# Patient Record
Sex: MALE | Race: WHITE | Employment: OTHER | URBAN - METROPOLITAN AREA
[De-identification: names, ages, dates, MRNs, and addresses within clinical notes are randomized per-mention and may not be internally consistent; named-entity substitution may affect disease eponyms.]

---

## 2018-03-23 ENCOUNTER — MEDICAL CORRESPONDENCE (OUTPATIENT)
Dept: HEALTH INFORMATION MANAGEMENT | Facility: CLINIC | Age: 83
End: 2018-03-23

## 2018-03-23 ENCOUNTER — TRANSFERRED RECORDS (OUTPATIENT)
Dept: HEALTH INFORMATION MANAGEMENT | Facility: CLINIC | Age: 83
End: 2018-03-23

## 2018-07-30 ENCOUNTER — TRANSFERRED RECORDS (OUTPATIENT)
Dept: HEALTH INFORMATION MANAGEMENT | Facility: CLINIC | Age: 83
End: 2018-07-30

## 2019-01-01 ENCOUNTER — APPOINTMENT (OUTPATIENT)
Dept: CT IMAGING | Facility: CLINIC | Age: 84
End: 2019-01-01
Payer: MEDICARE

## 2019-01-01 ENCOUNTER — HOSPITAL ENCOUNTER (OUTPATIENT)
Dept: CARDIOLOGY | Facility: CLINIC | Age: 84
Discharge: HOME | End: 2019-04-16
Attending: INTERNAL MEDICINE | Admitting: INTERNAL MEDICINE
Payer: MEDICARE

## 2019-01-01 ENCOUNTER — TELEPHONE (OUTPATIENT)
Dept: FAMILY MEDICINE | Facility: CLINIC | Age: 84
End: 2019-01-01

## 2019-01-01 ENCOUNTER — HOSPITAL ENCOUNTER (EMERGENCY)
Facility: CLINIC | Age: 84
Discharge: HOME | End: 2019-05-02
Attending: EMERGENCY MEDICINE | Admitting: EMERGENCY MEDICINE
Payer: MEDICARE

## 2019-01-01 ENCOUNTER — HOSPITAL ENCOUNTER (EMERGENCY)
Facility: CLINIC | Age: 84
Discharge: HOME | End: 2019-04-04
Attending: EMERGENCY MEDICINE | Admitting: EMERGENCY MEDICINE
Payer: MEDICARE

## 2019-01-01 ENCOUNTER — APPOINTMENT (OUTPATIENT)
Dept: GENERAL RADIOLOGY | Facility: CLINIC | Age: 84
End: 2019-01-01
Attending: EMERGENCY MEDICINE
Payer: MEDICARE

## 2019-01-01 ENCOUNTER — OFFICE VISIT (OUTPATIENT)
Dept: CARDIOLOGY | Facility: CLINIC | Age: 84
End: 2019-01-01
Payer: MEDICARE

## 2019-01-01 ENCOUNTER — TRANSFERRED RECORDS (OUTPATIENT)
Dept: HEALTH INFORMATION MANAGEMENT | Facility: CLINIC | Age: 84
End: 2019-01-01

## 2019-01-01 ENCOUNTER — TELEPHONE (OUTPATIENT)
Dept: OTHER | Facility: CLINIC | Age: 84
End: 2019-01-01

## 2019-01-01 ENCOUNTER — TELEPHONE (OUTPATIENT)
Dept: EMERGENCY MEDICINE | Facility: CLINIC | Age: 84
End: 2019-01-01

## 2019-01-01 ENCOUNTER — APPOINTMENT (OUTPATIENT)
Dept: GENERAL RADIOLOGY | Facility: CLINIC | Age: 84
End: 2019-01-01
Payer: MEDICARE

## 2019-01-01 ENCOUNTER — OFFICE VISIT (OUTPATIENT)
Dept: VASCULAR SURGERY | Facility: CLINIC | Age: 84
End: 2019-01-01
Payer: MEDICARE

## 2019-01-01 ENCOUNTER — MEDICAL CORRESPONDENCE (OUTPATIENT)
Dept: HEALTH INFORMATION MANAGEMENT | Facility: CLINIC | Age: 84
End: 2019-01-01

## 2019-01-01 ENCOUNTER — TELEPHONE (OUTPATIENT)
Dept: PODIATRY | Facility: CLINIC | Age: 84
End: 2019-01-01

## 2019-01-01 ENCOUNTER — HOSPITAL ENCOUNTER (EMERGENCY)
Facility: CLINIC | Age: 84
Discharge: HOME | End: 2019-01-26
Attending: EMERGENCY MEDICINE | Admitting: EMERGENCY MEDICINE
Payer: MEDICARE

## 2019-01-01 ENCOUNTER — NURSING HOME VISIT (OUTPATIENT)
Dept: GERIATRICS | Facility: CLINIC | Age: 84
End: 2019-01-01
Payer: MEDICARE

## 2019-01-01 ENCOUNTER — OFFICE VISIT (OUTPATIENT)
Dept: FAMILY MEDICINE | Facility: CLINIC | Age: 84
End: 2019-01-01
Payer: MEDICARE

## 2019-01-01 ENCOUNTER — APPOINTMENT (OUTPATIENT)
Dept: CT IMAGING | Facility: CLINIC | Age: 84
End: 2019-01-01
Attending: EMERGENCY MEDICINE
Payer: MEDICARE

## 2019-01-01 ENCOUNTER — OFFICE VISIT (OUTPATIENT)
Dept: CARDIOLOGY | Facility: CLINIC | Age: 84
End: 2019-01-01
Attending: INTERNAL MEDICINE
Payer: MEDICARE

## 2019-01-01 ENCOUNTER — HOSPITAL ENCOUNTER (OUTPATIENT)
Dept: RESPIRATORY THERAPY | Facility: CLINIC | Age: 84
Discharge: HOME | End: 2019-03-20
Attending: INTERNAL MEDICINE | Admitting: INTERNAL MEDICINE
Payer: MEDICARE

## 2019-01-01 ENCOUNTER — OFFICE VISIT (OUTPATIENT)
Dept: PODIATRY | Facility: CLINIC | Age: 84
End: 2019-01-01
Payer: MEDICARE

## 2019-01-01 ENCOUNTER — HOSPITAL ENCOUNTER (OUTPATIENT)
Dept: ULTRASOUND IMAGING | Facility: CLINIC | Age: 84
Discharge: HOME | End: 2019-04-26
Attending: PODIATRIST | Admitting: PODIATRIST
Payer: MEDICARE

## 2019-01-01 ENCOUNTER — HOSPITAL ENCOUNTER (OUTPATIENT)
Dept: CARDIOLOGY | Facility: CLINIC | Age: 84
Discharge: HOME | End: 2019-03-13
Attending: INTERNAL MEDICINE | Admitting: INTERNAL MEDICINE
Payer: MEDICARE

## 2019-01-01 VITALS
RESPIRATION RATE: 14 BRPM | HEART RATE: 72 BPM | SYSTOLIC BLOOD PRESSURE: 156 MMHG | TEMPERATURE: 97 F | OXYGEN SATURATION: 99 % | DIASTOLIC BLOOD PRESSURE: 106 MMHG | WEIGHT: 210 LBS

## 2019-01-01 VITALS
OXYGEN SATURATION: 92 % | DIASTOLIC BLOOD PRESSURE: 70 MMHG | HEART RATE: 82 BPM | RESPIRATION RATE: 18 BRPM | SYSTOLIC BLOOD PRESSURE: 128 MMHG | TEMPERATURE: 98.8 F

## 2019-01-01 VITALS
DIASTOLIC BLOOD PRESSURE: 73 MMHG | HEART RATE: 66 BPM | OXYGEN SATURATION: 94 % | WEIGHT: 210 LBS | BODY MASS INDEX: 28.48 KG/M2 | SYSTOLIC BLOOD PRESSURE: 143 MMHG

## 2019-01-01 VITALS
HEART RATE: 84 BPM | HEIGHT: 73 IN | WEIGHT: 206 LBS | OXYGEN SATURATION: 97 % | TEMPERATURE: 101.2 F | SYSTOLIC BLOOD PRESSURE: 100 MMHG | BODY MASS INDEX: 27.3 KG/M2 | RESPIRATION RATE: 20 BRPM | DIASTOLIC BLOOD PRESSURE: 56 MMHG

## 2019-01-01 VITALS
HEART RATE: 97 BPM | SYSTOLIC BLOOD PRESSURE: 128 MMHG | TEMPERATURE: 97.9 F | OXYGEN SATURATION: 94 % | BODY MASS INDEX: 26.52 KG/M2 | RESPIRATION RATE: 20 BRPM | DIASTOLIC BLOOD PRESSURE: 70 MMHG | WEIGHT: 201 LBS

## 2019-01-01 VITALS
HEIGHT: 73 IN | HEART RATE: 59 BPM | BODY MASS INDEX: 27.83 KG/M2 | RESPIRATION RATE: 16 BRPM | WEIGHT: 210 LBS | OXYGEN SATURATION: 99 % | TEMPERATURE: 97.7 F | SYSTOLIC BLOOD PRESSURE: 111 MMHG | DIASTOLIC BLOOD PRESSURE: 75 MMHG

## 2019-01-01 VITALS
RESPIRATION RATE: 20 BRPM | HEIGHT: 73 IN | WEIGHT: 205 LBS | HEART RATE: 66 BPM | DIASTOLIC BLOOD PRESSURE: 54 MMHG | OXYGEN SATURATION: 94 % | TEMPERATURE: 98.6 F | BODY MASS INDEX: 27.17 KG/M2 | SYSTOLIC BLOOD PRESSURE: 96 MMHG

## 2019-01-01 VITALS
BODY MASS INDEX: 27.83 KG/M2 | WEIGHT: 210 LBS | DIASTOLIC BLOOD PRESSURE: 64 MMHG | HEART RATE: 85 BPM | SYSTOLIC BLOOD PRESSURE: 116 MMHG | HEIGHT: 73 IN

## 2019-01-01 VITALS
WEIGHT: 198 LBS | HEART RATE: 81 BPM | BODY MASS INDEX: 26.12 KG/M2 | OXYGEN SATURATION: 97 % | TEMPERATURE: 97.5 F | RESPIRATION RATE: 18 BRPM | SYSTOLIC BLOOD PRESSURE: 140 MMHG | DIASTOLIC BLOOD PRESSURE: 67 MMHG

## 2019-01-01 VITALS
HEIGHT: 72 IN | RESPIRATION RATE: 18 BRPM | SYSTOLIC BLOOD PRESSURE: 148 MMHG | BODY MASS INDEX: 28.44 KG/M2 | TEMPERATURE: 97.9 F | OXYGEN SATURATION: 98 % | WEIGHT: 210 LBS | HEART RATE: 73 BPM | DIASTOLIC BLOOD PRESSURE: 90 MMHG

## 2019-01-01 VITALS
HEART RATE: 78 BPM | TEMPERATURE: 98.1 F | OXYGEN SATURATION: 97 % | SYSTOLIC BLOOD PRESSURE: 116 MMHG | RESPIRATION RATE: 16 BRPM | BODY MASS INDEX: 28.39 KG/M2 | DIASTOLIC BLOOD PRESSURE: 72 MMHG | WEIGHT: 209.3 LBS

## 2019-01-01 VITALS
HEART RATE: 108 BPM | BODY MASS INDEX: 25.6 KG/M2 | OXYGEN SATURATION: 94 % | TEMPERATURE: 97.5 F | RESPIRATION RATE: 18 BRPM | DIASTOLIC BLOOD PRESSURE: 67 MMHG | WEIGHT: 194 LBS | SYSTOLIC BLOOD PRESSURE: 128 MMHG

## 2019-01-01 VITALS — DIASTOLIC BLOOD PRESSURE: 51 MMHG | HEART RATE: 86 BPM | SYSTOLIC BLOOD PRESSURE: 109 MMHG | RESPIRATION RATE: 18 BRPM

## 2019-01-01 VITALS
DIASTOLIC BLOOD PRESSURE: 54 MMHG | HEART RATE: 82 BPM | SYSTOLIC BLOOD PRESSURE: 94 MMHG | WEIGHT: 206 LBS | BODY MASS INDEX: 27.18 KG/M2

## 2019-01-01 VITALS
BODY MASS INDEX: 27.73 KG/M2 | SYSTOLIC BLOOD PRESSURE: 127 MMHG | HEART RATE: 83 BPM | WEIGHT: 210.2 LBS | OXYGEN SATURATION: 98 % | DIASTOLIC BLOOD PRESSURE: 55 MMHG

## 2019-01-01 DIAGNOSIS — I50.32 CHRONIC DIASTOLIC HEART FAILURE (H): ICD-10-CM

## 2019-01-01 DIAGNOSIS — I49.3 PVC'S (PREMATURE VENTRICULAR CONTRACTIONS): ICD-10-CM

## 2019-01-01 DIAGNOSIS — G47.00 INSOMNIA, UNSPECIFIED TYPE: ICD-10-CM

## 2019-01-01 DIAGNOSIS — S91.209A NAIL AVULSION OF TOE, INITIAL ENCOUNTER: ICD-10-CM

## 2019-01-01 DIAGNOSIS — L97.521 TOE ULCER, LEFT, LIMITED TO BREAKDOWN OF SKIN (H): Primary | ICD-10-CM

## 2019-01-01 DIAGNOSIS — E78.5 HYPERLIPIDEMIA LDL GOAL <100: Primary | ICD-10-CM

## 2019-01-01 DIAGNOSIS — A04.72 COLITIS DUE TO CLOSTRIDIUM DIFFICILE: Primary | ICD-10-CM

## 2019-01-01 DIAGNOSIS — I25.708 CORONARY ARTERY DISEASE OF BYPASS GRAFT OF NATIVE HEART WITH STABLE ANGINA PECTORIS (H): Primary | ICD-10-CM

## 2019-01-01 DIAGNOSIS — S52.121D CLOSED DISPLACED FRACTURE OF HEAD OF RIGHT RADIUS WITH ROUTINE HEALING, SUBSEQUENT ENCOUNTER: ICD-10-CM

## 2019-01-01 DIAGNOSIS — Z01.818 PREOP GENERAL PHYSICAL EXAM: Primary | ICD-10-CM

## 2019-01-01 DIAGNOSIS — I35.0 AORTIC VALVE STENOSIS, ETIOLOGY OF CARDIAC VALVE DISEASE UNSPECIFIED: ICD-10-CM

## 2019-01-01 DIAGNOSIS — R79.89 ELEVATED SERUM CREATININE: Primary | ICD-10-CM

## 2019-01-01 DIAGNOSIS — F32.1 MODERATE MAJOR DEPRESSION (H): ICD-10-CM

## 2019-01-01 DIAGNOSIS — Z79.01 LONG TERM CURRENT USE OF ANTICOAGULANT THERAPY: ICD-10-CM

## 2019-01-01 DIAGNOSIS — I73.9 PERIPHERAL VASCULAR DISEASE (H): ICD-10-CM

## 2019-01-01 DIAGNOSIS — Z86.73 HISTORY OF CVA (CEREBROVASCULAR ACCIDENT): ICD-10-CM

## 2019-01-01 DIAGNOSIS — I73.9 PVD (PERIPHERAL VASCULAR DISEASE) (H): ICD-10-CM

## 2019-01-01 DIAGNOSIS — M20.32 HALLUX HAMMERTOE, LEFT: ICD-10-CM

## 2019-01-01 DIAGNOSIS — B95.5 BACTEREMIA DUE TO STREPTOCOCCUS: Primary | ICD-10-CM

## 2019-01-01 DIAGNOSIS — Z79.899 ON AMIODARONE THERAPY: ICD-10-CM

## 2019-01-01 DIAGNOSIS — I48.0 PAROXYSMAL ATRIAL FIBRILLATION (H): ICD-10-CM

## 2019-01-01 DIAGNOSIS — E86.0 DEHYDRATION: ICD-10-CM

## 2019-01-01 DIAGNOSIS — I70.212 ATHEROSCLER OF NATIVE ARTERY OF LEFT LEG WITH INTERMIT CLAUDICATION (H): Primary | ICD-10-CM

## 2019-01-01 DIAGNOSIS — K22.2 ESOPHAGEAL STRICTURE: ICD-10-CM

## 2019-01-01 DIAGNOSIS — M48.061 SPINAL STENOSIS OF LUMBAR REGION, UNSPECIFIED WHETHER NEUROGENIC CLAUDICATION PRESENT: ICD-10-CM

## 2019-01-01 DIAGNOSIS — Z86.19 HISTORY OF CLOSTRIDIUM DIFFICILE COLITIS: ICD-10-CM

## 2019-01-01 DIAGNOSIS — K22.2 ESOPHAGEAL STRICTURE: Primary | ICD-10-CM

## 2019-01-01 DIAGNOSIS — R41.89 COGNITIVE IMPAIRMENT: ICD-10-CM

## 2019-01-01 DIAGNOSIS — I10 ESSENTIAL HYPERTENSION: ICD-10-CM

## 2019-01-01 DIAGNOSIS — R78.81 BACTEREMIA DUE TO STREPTOCOCCUS: ICD-10-CM

## 2019-01-01 DIAGNOSIS — M81.0 OSTEOPOROSIS, UNSPECIFIED OSTEOPOROSIS TYPE, UNSPECIFIED PATHOLOGICAL FRACTURE PRESENCE: Primary | ICD-10-CM

## 2019-01-01 DIAGNOSIS — R07.89 CHEST DISCOMFORT: ICD-10-CM

## 2019-01-01 DIAGNOSIS — M47.9 OSTEOARTHRITIS OF SPINE, UNSPECIFIED SPINAL OSTEOARTHRITIS COMPLICATION STATUS, UNSPECIFIED SPINAL REGION: ICD-10-CM

## 2019-01-01 DIAGNOSIS — S16.1XXA STRAIN OF NECK MUSCLE, INITIAL ENCOUNTER: ICD-10-CM

## 2019-01-01 DIAGNOSIS — Z51.81 ENCOUNTER FOR THERAPEUTIC DRUG MONITORING: ICD-10-CM

## 2019-01-01 DIAGNOSIS — R78.81 BACTEREMIA DUE TO STREPTOCOCCUS: Primary | ICD-10-CM

## 2019-01-01 DIAGNOSIS — M86.9 OSTEOMYELITIS OF TOE OF LEFT FOOT (H): ICD-10-CM

## 2019-01-01 DIAGNOSIS — G89.29 OTHER CHRONIC PAIN: ICD-10-CM

## 2019-01-01 DIAGNOSIS — I95.1 ORTHOSTATIC HYPOTENSION: ICD-10-CM

## 2019-01-01 DIAGNOSIS — N18.30 CKD (CHRONIC KIDNEY DISEASE) STAGE 3, GFR 30-59 ML/MIN (H): ICD-10-CM

## 2019-01-01 DIAGNOSIS — I25.10 ASHD (ARTERIOSCLEROTIC HEART DISEASE): ICD-10-CM

## 2019-01-01 DIAGNOSIS — I48.19 PERSISTENT ATRIAL FIBRILLATION (H): Primary | ICD-10-CM

## 2019-01-01 DIAGNOSIS — E78.5 HYPERLIPIDEMIA LDL GOAL <70: ICD-10-CM

## 2019-01-01 DIAGNOSIS — E13.621 DIABETIC ULCER OF TOE OF LEFT FOOT ASSOCIATED WITH DIABETES MELLITUS OF OTHER TYPE, UNSPECIFIED ULCER STAGE (H): Primary | ICD-10-CM

## 2019-01-01 DIAGNOSIS — L97.522: Primary | ICD-10-CM
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
## 2019-01-01 DIAGNOSIS — G62.9 PERIPHERAL POLYNEUROPATHY: ICD-10-CM

## 2019-01-01 DIAGNOSIS — K21.00 GASTROESOPHAGEAL REFLUX DISEASE WITH ESOPHAGITIS: ICD-10-CM

## 2019-01-01 DIAGNOSIS — L97.521 TOE ULCER, LEFT, LIMITED TO BREAKDOWN OF SKIN (H): ICD-10-CM

## 2019-01-01 DIAGNOSIS — I48.19 PERSISTENT ATRIAL FIBRILLATION (H): ICD-10-CM

## 2019-01-01 DIAGNOSIS — I48.20 CHRONIC ATRIAL FIBRILLATION (H): ICD-10-CM

## 2019-01-01 DIAGNOSIS — E78.5 HYPERLIPIDEMIA LDL GOAL <100: ICD-10-CM

## 2019-01-01 DIAGNOSIS — S41.111A ARM LACERATION, RIGHT, INITIAL ENCOUNTER: ICD-10-CM

## 2019-01-01 DIAGNOSIS — I25.10 ASHD (ARTERIOSCLEROTIC HEART DISEASE): Primary | ICD-10-CM

## 2019-01-01 DIAGNOSIS — Z79.899 ON AMIODARONE THERAPY: Primary | ICD-10-CM

## 2019-01-01 DIAGNOSIS — L97.529 DIABETIC ULCER OF TOE OF LEFT FOOT ASSOCIATED WITH DIABETES MELLITUS OF OTHER TYPE, UNSPECIFIED ULCER STAGE (H): Primary | ICD-10-CM

## 2019-01-01 DIAGNOSIS — B95.5 BACTEREMIA DUE TO STREPTOCOCCUS: ICD-10-CM

## 2019-01-01 DIAGNOSIS — R29.6 FALLS FREQUENTLY: Primary | ICD-10-CM

## 2019-01-01 DIAGNOSIS — D64.9 ANEMIA, UNSPECIFIED TYPE: ICD-10-CM

## 2019-01-01 DIAGNOSIS — R60.0 PERIPHERAL EDEMA: ICD-10-CM

## 2019-01-01 DIAGNOSIS — Z79.01 CHRONIC ANTICOAGULATION: ICD-10-CM

## 2019-01-01 DIAGNOSIS — K59.00 CONSTIPATION, UNSPECIFIED CONSTIPATION TYPE: ICD-10-CM

## 2019-01-01 DIAGNOSIS — I48.91 ATRIAL FIBRILLATION (H): Primary | ICD-10-CM

## 2019-01-01 DIAGNOSIS — R19.7 DIARRHEA, UNSPECIFIED TYPE: ICD-10-CM

## 2019-01-01 DIAGNOSIS — I48.91 ATRIAL FIBRILLATION (H): ICD-10-CM

## 2019-01-01 DIAGNOSIS — F32.A DEPRESSION, UNSPECIFIED DEPRESSION TYPE: ICD-10-CM

## 2019-01-01 DIAGNOSIS — I73.9 PVD (PERIPHERAL VASCULAR DISEASE) (H): Primary | ICD-10-CM

## 2019-01-01 DIAGNOSIS — L97.511 SKIN ULCER OF TOE OF RIGHT FOOT, LIMITED TO BREAKDOWN OF SKIN (H): ICD-10-CM

## 2019-01-01 LAB
ALBUMIN SERPL-MCNC: 3.1 G/DL (ref 3.4–5)
ALBUMIN SERPL-MCNC: 3.9 G/DL (ref 3.4–5)
ALP SERPL-CCNC: 170 U/L (ref 40–150)
ALP SERPL-CCNC: 292 U/L (ref 40–150)
ALT SERPL W P-5'-P-CCNC: 13 U/L (ref 0–70)
ALT SERPL W P-5'-P-CCNC: 16 U/L (ref 0–70)
ALT SERPL W P-5'-P-CCNC: 20 U/L (ref 0–70)
ALT SERPL W P-5'-P-CCNC: 24 U/L (ref 0–70)
ANION GAP SERPL CALCULATED.3IONS-SCNC: 4 MMOL/L (ref 3–14)
ANION GAP SERPL CALCULATED.3IONS-SCNC: 5 MMOL/L (ref 3–14)
ANION GAP SERPL CALCULATED.3IONS-SCNC: 6 MMOL/L (ref 3–14)
ANION GAP SERPL CALCULATED.3IONS-SCNC: 7 MMOL/L (ref 3–14)
ANION GAP SERPL CALCULATED.3IONS-SCNC: 7 MMOL/L (ref 3–14)
AST SERPL W P-5'-P-CCNC: 13 U/L (ref 0–45)
AST SERPL W P-5'-P-CCNC: 17 U/L (ref 0–45)
AST SERPL W P-5'-P-CCNC: 21 U/L (ref 0–45)
BASOPHILS # BLD AUTO: 0 10E9/L (ref 0–0.2)
BASOPHILS # BLD AUTO: 0.1 10E9/L (ref 0–0.2)
BASOPHILS NFR BLD AUTO: 0.6 %
BASOPHILS NFR BLD AUTO: 1 %
BILIRUB DIRECT SERPL-MCNC: 0.2 MG/DL (ref 0–0.2)
BILIRUB SERPL-MCNC: 0.6 MG/DL (ref 0.2–1.3)
BILIRUB SERPL-MCNC: 0.8 MG/DL (ref 0.2–1.3)
BUN SERPL-MCNC: 17 MG/DL (ref 7–30)
BUN SERPL-MCNC: 28 MG/DL (ref 7–30)
BUN SERPL-MCNC: 32 MG/DL (ref 7–30)
BUN SERPL-MCNC: 33 MG/DL (ref 7–30)
BUN SERPL-MCNC: 35 MG/DL (ref 7–30)
C COLI+JEJUNI+LARI FUSA STL QL NAA+PROBE: NOT DETECTED
C DIFF TOX B STL QL: POSITIVE
CALCIUM SERPL-MCNC: 9.1 MG/DL (ref 8.5–10.1)
CALCIUM SERPL-MCNC: 9.1 MG/DL (ref 8.5–10.1)
CALCIUM SERPL-MCNC: 9.3 MG/DL (ref 8.5–10.1)
CALCIUM SERPL-MCNC: 9.3 MG/DL (ref 8.5–10.1)
CALCIUM SERPL-MCNC: 9.4 MG/DL (ref 8.5–10.1)
CHLORIDE SERPL-SCNC: 102 MMOL/L (ref 94–109)
CHLORIDE SERPL-SCNC: 104 MMOL/L (ref 94–109)
CHLORIDE SERPL-SCNC: 105 MMOL/L (ref 94–109)
CHLORIDE SERPL-SCNC: 109 MMOL/L (ref 94–109)
CHLORIDE SERPL-SCNC: 111 MMOL/L (ref 94–109)
CHOLEST SERPL-MCNC: 132 MG/DL
CO2 SERPL-SCNC: 25 MMOL/L (ref 20–32)
CO2 SERPL-SCNC: 30 MMOL/L (ref 20–32)
CO2 SERPL-SCNC: 30 MMOL/L (ref 20–32)
CO2 SERPL-SCNC: 31 MMOL/L (ref 20–32)
CO2 SERPL-SCNC: 32 MMOL/L (ref 20–32)
CREAT SERPL-MCNC: 1.41 MG/DL (ref 0.66–1.25)
CREAT SERPL-MCNC: 1.43 MG/DL (ref 0.66–1.25)
CREAT SERPL-MCNC: 1.58 MG/DL (ref 0.66–1.25)
CREAT SERPL-MCNC: 1.62 MG/DL (ref 0.66–1.25)
CREAT SERPL-MCNC: 1.91 MG/DL (ref 0.66–1.25)
CREAT SERPL-MCNC: 2.23 MG/DL (ref 0.66–1.25)
DIFFERENTIAL METHOD BLD: ABNORMAL
DIFFERENTIAL METHOD BLD: ABNORMAL
DLCOCOR-%PRED-PRE: 100 %
DLCOCOR-PRE: 25.1 ML/MIN/MMHG
DLCOUNC-%PRED-PRE: 86 %
DLCOUNC-PRE: 21.73 ML/MIN/MMHG
DLCOUNC-PRED: 25.01 ML/MIN/MMHG
EC STX1 GENE STL QL NAA+PROBE: NOT DETECTED
EC STX2 GENE STL QL NAA+PROBE: NOT DETECTED
ENTERIC PATHOGEN COMMENT: NORMAL
EOSINOPHIL # BLD AUTO: 0.2 10E9/L (ref 0–0.7)
EOSINOPHIL # BLD AUTO: 0.3 10E9/L (ref 0–0.7)
EOSINOPHIL NFR BLD AUTO: 3.2 %
EOSINOPHIL NFR BLD AUTO: 5.4 %
ERV-%PRED-PRE: 148 %
ERV-PRE: 1.3 L
ERV-PRED: 0.87 L
ERYTHROCYTE [DISTWIDTH] IN BLOOD BY AUTOMATED COUNT: 13.3 % (ref 10–15)
ERYTHROCYTE [DISTWIDTH] IN BLOOD BY AUTOMATED COUNT: 13.5 % (ref 10–15)
ERYTHROCYTE [DISTWIDTH] IN BLOOD BY AUTOMATED COUNT: 13.5 % (ref 10–15)
EXPTIME-PRE: 6.47 SEC
FEF2575-%PRED-PRE: 76 %
FEF2575-PRE: 1.57 L/SEC
FEF2575-PRED: 2.04 L/SEC
FEFMAX-%PRED-PRE: 80 %
FEFMAX-PRE: 5.87 L/SEC
FEFMAX-PRED: 7.34 L/SEC
FEV1-%PRED-PRE: 82 %
FEV1-PRE: 2.42 L
FEV1FEV6-PRE: 72 %
FEV1FEV6-PRED: 76 %
FEV1FVC-PRE: 71 %
FEV1FVC-PRED: 74 %
FEV1SVC-PRE: 72 %
FEV1SVC-PRED: 63 %
FIFMAX-PRE: 4.45 L/SEC
FVC-%PRED-PRE: 85 %
FVC-PRE: 3.41 L
FVC-PRED: 3.99 L
GFR SERPL CREATININE-BSD FRML MDRD: 26 ML/MIN/{1.73_M2}
GFR SERPL CREATININE-BSD FRML MDRD: 32 ML/MIN/{1.73_M2}
GFR SERPL CREATININE-BSD FRML MDRD: 39 ML/MIN/{1.73_M2}
GFR SERPL CREATININE-BSD FRML MDRD: 40 ML/MIN/{1.73_M2}
GFR SERPL CREATININE-BSD FRML MDRD: 45 ML/MIN/{1.73_M2}
GFR SERPL CREATININE-BSD FRML MDRD: 46 ML/MIN/{1.73_M2}
GLUCOSE SERPL-MCNC: 125 MG/DL (ref 70–99)
GLUCOSE SERPL-MCNC: 84 MG/DL (ref 70–99)
GLUCOSE SERPL-MCNC: 86 MG/DL (ref 70–99)
GLUCOSE SERPL-MCNC: 95 MG/DL (ref 70–99)
GLUCOSE SERPL-MCNC: 99 MG/DL (ref 70–99)
HCT VFR BLD AUTO: 33 % (ref 40–53)
HCT VFR BLD AUTO: 35.1 % (ref 40–53)
HCT VFR BLD AUTO: 35.6 % (ref 40–53)
HDLC SERPL-MCNC: 43 MG/DL
HGB BLD-MCNC: 10.6 G/DL (ref 13.3–17.7)
HGB BLD-MCNC: 11.3 G/DL (ref 13.3–17.7)
HGB BLD-MCNC: 11.8 G/DL (ref 13.3–17.7)
IC-%PRED-PRE: 52 %
IC-PRE: 2.01 L
IC-PRED: 3.81 L
IMM GRANULOCYTES # BLD: 0 10E9/L (ref 0–0.4)
IMM GRANULOCYTES # BLD: 0 10E9/L (ref 0–0.4)
IMM GRANULOCYTES NFR BLD: 0.2 %
IMM GRANULOCYTES NFR BLD: 0.4 %
LDLC SERPL CALC-MCNC: 64 MG/DL
LYMPHOCYTES # BLD AUTO: 1.1 10E9/L (ref 0.8–5.3)
LYMPHOCYTES # BLD AUTO: 1.3 10E9/L (ref 0.8–5.3)
LYMPHOCYTES NFR BLD AUTO: 10.6 %
LYMPHOCYTES NFR BLD AUTO: 32.7 %
MCH RBC QN AUTO: 31 PG (ref 26.5–33)
MCH RBC QN AUTO: 32.1 PG (ref 26.5–33)
MCH RBC QN AUTO: 33.2 PG (ref 26.5–33)
MCHC RBC AUTO-ENTMCNC: 32.1 G/DL (ref 31.5–36.5)
MCHC RBC AUTO-ENTMCNC: 32.2 G/DL (ref 31.5–36.5)
MCHC RBC AUTO-ENTMCNC: 33.1 G/DL (ref 31.5–36.5)
MCV RBC AUTO: 100 FL (ref 78–100)
MCV RBC AUTO: 100 FL (ref 78–100)
MCV RBC AUTO: 96 FL (ref 78–100)
MONOCYTES # BLD AUTO: 0.3 10E9/L (ref 0–1.3)
MONOCYTES # BLD AUTO: 0.7 10E9/L (ref 0–1.3)
MONOCYTES NFR BLD AUTO: 6.7 %
MONOCYTES NFR BLD AUTO: 7.6 %
NEUTROPHILS # BLD AUTO: 2.2 10E9/L (ref 1.6–8.3)
NEUTROPHILS # BLD AUTO: 8.1 10E9/L (ref 1.6–8.3)
NEUTROPHILS NFR BLD AUTO: 53.1 %
NEUTROPHILS NFR BLD AUTO: 78.5 %
NONHDLC SERPL-MCNC: 89 MG/DL
NOROV GI+II ORF1-ORF2 JNC STL QL NAA+PR: NOT DETECTED
NRBC # BLD AUTO: 0 10*3/UL
NRBC # BLD AUTO: 0 10*3/UL
NRBC BLD AUTO-RTO: 0 /100
NRBC BLD AUTO-RTO: 0 /100
NT-PROBNP SERPL-MCNC: 3831 PG/ML (ref 0–450)
NT-PROBNP SERPL-MCNC: 4203 PG/ML (ref 0–450)
PLATELET # BLD AUTO: 152 10E9/L (ref 150–450)
PLATELET # BLD AUTO: 153 10E9/L (ref 150–450)
PLATELET # BLD AUTO: 293 10E9/L (ref 150–450)
POTASSIUM SERPL-SCNC: 4 MMOL/L (ref 3.4–5.3)
POTASSIUM SERPL-SCNC: 4.5 MMOL/L (ref 3.4–5.3)
POTASSIUM SERPL-SCNC: 4.5 MMOL/L (ref 3.4–5.3)
POTASSIUM SERPL-SCNC: 4.6 MMOL/L (ref 3.4–5.3)
POTASSIUM SERPL-SCNC: 4.6 MMOL/L (ref 3.4–5.3)
PROT SERPL-MCNC: 7 G/DL (ref 6.8–8.8)
PROT SERPL-MCNC: 7.2 G/DL (ref 6.8–8.8)
RBC # BLD AUTO: 3.3 10E12/L (ref 4.4–5.9)
RBC # BLD AUTO: 3.55 10E12/L (ref 4.4–5.9)
RBC # BLD AUTO: 3.64 10E12/L (ref 4.4–5.9)
RVA NSP5 STL QL NAA+PROBE: NOT DETECTED
SALMONELLA SP RPOD STL QL NAA+PROBE: NOT DETECTED
SHIGELLA SP+EIEC IPAH STL QL NAA+PROBE: NOT DETECTED
SODIUM SERPL-SCNC: 139 MMOL/L (ref 133–144)
SODIUM SERPL-SCNC: 141 MMOL/L (ref 133–144)
SODIUM SERPL-SCNC: 142 MMOL/L (ref 133–144)
SODIUM SERPL-SCNC: 143 MMOL/L (ref 133–144)
SODIUM SERPL-SCNC: 143 MMOL/L (ref 133–144)
SPECIMEN SOURCE: ABNORMAL
TRIGL SERPL-MCNC: 126 MG/DL
TROPONIN I SERPL-MCNC: 0.02 UG/L (ref 0–0.04)
TROPONIN I SERPL-MCNC: 0.02 UG/L (ref 0–0.04)
TSH SERPL DL<=0.005 MIU/L-ACNC: 2.11 MU/L (ref 0.4–4)
V CHOL+PARA RFBL+TRKH+TNAA STL QL NAA+PR: NOT DETECTED
VA-%PRED-PRE: 82 %
VA-PRE: 5.36 L
VC-%PRED-PRE: 72 %
VC-PRE: 3.38 L
VC-PRED: 4.68 L
WBC # BLD AUTO: 10.4 10E9/L (ref 4–11)
WBC # BLD AUTO: 4.1 10E9/L (ref 4–11)
WBC # BLD AUTO: 5.2 10E9/L (ref 4–11)
Y ENTERO RECN STL QL NAA+PROBE: NOT DETECTED

## 2019-01-01 PROCEDURE — 82565 ASSAY OF CREATININE: CPT | Performed by: NURSE PRACTITIONER

## 2019-01-01 PROCEDURE — 85027 COMPLETE CBC AUTOMATED: CPT | Performed by: NURSE PRACTITIONER

## 2019-01-01 PROCEDURE — 93010 ELECTROCARDIOGRAM REPORT: CPT | Performed by: INTERNAL MEDICINE

## 2019-01-01 PROCEDURE — 99214 OFFICE O/P EST MOD 30 MIN: CPT | Performed by: INTERNAL MEDICINE

## 2019-01-01 PROCEDURE — 84460 ALANINE AMINO (ALT) (SGPT): CPT | Performed by: INTERNAL MEDICINE

## 2019-01-01 PROCEDURE — 99285 EMERGENCY DEPT VISIT HI MDM: CPT | Mod: 25 | Performed by: EMERGENCY MEDICINE

## 2019-01-01 PROCEDURE — 99284 EMERGENCY DEPT VISIT MOD MDM: CPT | Mod: 25 | Performed by: EMERGENCY MEDICINE

## 2019-01-01 PROCEDURE — 94729 DIFFUSING CAPACITY: CPT | Mod: 26 | Performed by: INTERNAL MEDICINE

## 2019-01-01 PROCEDURE — 71046 X-RAY EXAM CHEST 2 VIEWS: CPT

## 2019-01-01 PROCEDURE — 85025 COMPLETE CBC W/AUTO DIFF WBC: CPT | Performed by: EMERGENCY MEDICINE

## 2019-01-01 PROCEDURE — 99309 SBSQ NF CARE MODERATE MDM 30: CPT | Performed by: NURSE PRACTITIONER

## 2019-01-01 PROCEDURE — 36415 COLL VENOUS BLD VENIPUNCTURE: CPT | Performed by: NURSE PRACTITIONER

## 2019-01-01 PROCEDURE — 99215 OFFICE O/P EST HI 40 MIN: CPT | Performed by: NURSE PRACTITIONER

## 2019-01-01 PROCEDURE — 80061 LIPID PANEL: CPT | Performed by: FAMILY MEDICINE

## 2019-01-01 PROCEDURE — 94726 PLETHYSMOGRAPHY LUNG VOLUMES: CPT

## 2019-01-01 PROCEDURE — 93924 LWR XTR VASC STDY BILAT: CPT

## 2019-01-01 PROCEDURE — 96376 TX/PRO/DX INJ SAME DRUG ADON: CPT | Performed by: EMERGENCY MEDICINE

## 2019-01-01 PROCEDURE — 12004 RPR S/N/AX/GEN/TRK7.6-12.5CM: CPT | Mod: RT | Performed by: EMERGENCY MEDICINE

## 2019-01-01 PROCEDURE — 80053 COMPREHEN METABOLIC PANEL: CPT | Performed by: EMERGENCY MEDICINE

## 2019-01-01 PROCEDURE — 99283 EMERGENCY DEPT VISIT LOW MDM: CPT | Mod: 25 | Performed by: EMERGENCY MEDICINE

## 2019-01-01 PROCEDURE — 99214 OFFICE O/P EST MOD 30 MIN: CPT | Performed by: FAMILY MEDICINE

## 2019-01-01 PROCEDURE — 99203 OFFICE O/P NEW LOW 30 MIN: CPT | Performed by: INTERNAL MEDICINE

## 2019-01-01 PROCEDURE — 84443 ASSAY THYROID STIM HORMONE: CPT | Performed by: INTERNAL MEDICINE

## 2019-01-01 PROCEDURE — 36415 COLL VENOUS BLD VENIPUNCTURE: CPT | Performed by: INTERNAL MEDICINE

## 2019-01-01 PROCEDURE — 84450 TRANSFERASE (AST) (SGOT): CPT | Performed by: INTERNAL MEDICINE

## 2019-01-01 PROCEDURE — 93010 ELECTROCARDIOGRAM REPORT: CPT | Mod: Z6 | Performed by: EMERGENCY MEDICINE

## 2019-01-01 PROCEDURE — 25000128 H RX IP 250 OP 636: Performed by: EMERGENCY MEDICINE

## 2019-01-01 PROCEDURE — 27210282 ZZH ADHESIVE DERMABOND SKIN: Performed by: EMERGENCY MEDICINE

## 2019-01-01 PROCEDURE — 93005 ELECTROCARDIOGRAM TRACING: CPT

## 2019-01-01 PROCEDURE — 94010 BREATHING CAPACITY TEST: CPT | Mod: 26 | Performed by: INTERNAL MEDICINE

## 2019-01-01 PROCEDURE — 99214 OFFICE O/P EST MOD 30 MIN: CPT | Performed by: NURSE PRACTITIONER

## 2019-01-01 PROCEDURE — 80048 BASIC METABOLIC PNL TOTAL CA: CPT | Performed by: INTERNAL MEDICINE

## 2019-01-01 PROCEDURE — 72125 CT NECK SPINE W/O DYE: CPT

## 2019-01-01 PROCEDURE — 74177 CT ABD & PELVIS W/CONTRAST: CPT

## 2019-01-01 PROCEDURE — 25000125 ZZHC RX 250: Performed by: EMERGENCY MEDICINE

## 2019-01-01 PROCEDURE — 80048 BASIC METABOLIC PNL TOTAL CA: CPT | Performed by: NURSE PRACTITIONER

## 2019-01-01 PROCEDURE — 83880 ASSAY OF NATRIURETIC PEPTIDE: CPT | Performed by: NURSE PRACTITIONER

## 2019-01-01 PROCEDURE — 25800030 ZZH RX IP 258 OP 636: Performed by: EMERGENCY MEDICINE

## 2019-01-01 PROCEDURE — 70450 CT HEAD/BRAIN W/O DYE: CPT

## 2019-01-01 PROCEDURE — 94010 BREATHING CAPACITY TEST: CPT

## 2019-01-01 PROCEDURE — 84484 ASSAY OF TROPONIN QUANT: CPT | Performed by: EMERGENCY MEDICINE

## 2019-01-01 PROCEDURE — 93306 TTE W/DOPPLER COMPLETE: CPT | Mod: 26 | Performed by: INTERNAL MEDICINE

## 2019-01-01 PROCEDURE — 87493 C DIFF AMPLIFIED PROBE: CPT | Mod: XU | Performed by: EMERGENCY MEDICINE

## 2019-01-01 PROCEDURE — 99204 OFFICE O/P NEW MOD 45 MIN: CPT | Performed by: SURGERY

## 2019-01-01 PROCEDURE — 99213 OFFICE O/P EST LOW 20 MIN: CPT | Performed by: PODIATRIST

## 2019-01-01 PROCEDURE — 96361 HYDRATE IV INFUSION ADD-ON: CPT | Performed by: EMERGENCY MEDICINE

## 2019-01-01 PROCEDURE — 99310 SBSQ NF CARE HIGH MDM 45: CPT | Performed by: NURSE PRACTITIONER

## 2019-01-01 PROCEDURE — 87506 IADNA-DNA/RNA PROBE TQ 6-11: CPT | Performed by: EMERGENCY MEDICINE

## 2019-01-01 PROCEDURE — 80048 BASIC METABOLIC PNL TOTAL CA: CPT | Performed by: EMERGENCY MEDICINE

## 2019-01-01 PROCEDURE — 83880 ASSAY OF NATRIURETIC PEPTIDE: CPT | Performed by: INTERNAL MEDICINE

## 2019-01-01 PROCEDURE — 96374 THER/PROPH/DIAG INJ IV PUSH: CPT | Mod: 59 | Performed by: EMERGENCY MEDICINE

## 2019-01-01 PROCEDURE — 80076 HEPATIC FUNCTION PANEL: CPT | Performed by: EMERGENCY MEDICINE

## 2019-01-01 PROCEDURE — 93306 TTE W/DOPPLER COMPLETE: CPT

## 2019-01-01 PROCEDURE — 73660 X-RAY EXAM OF TOE(S): CPT | Mod: RT

## 2019-01-01 PROCEDURE — 99204 OFFICE O/P NEW MOD 45 MIN: CPT | Performed by: FAMILY MEDICINE

## 2019-01-01 PROCEDURE — 99207 ZZC CDG-MDM COMPONENT: MEETS LOW - DOWN CODED: CPT | Performed by: NURSE PRACTITIONER

## 2019-01-01 PROCEDURE — 96375 TX/PRO/DX INJ NEW DRUG ADDON: CPT | Performed by: EMERGENCY MEDICINE

## 2019-01-01 PROCEDURE — 99284 EMERGENCY DEPT VISIT MOD MDM: CPT | Mod: Z6 | Performed by: EMERGENCY MEDICINE

## 2019-01-01 PROCEDURE — 96360 HYDRATION IV INFUSION INIT: CPT | Performed by: EMERGENCY MEDICINE

## 2019-01-01 PROCEDURE — 93005 ELECTROCARDIOGRAM TRACING: CPT | Performed by: EMERGENCY MEDICINE

## 2019-01-01 PROCEDURE — 94729 DIFFUSING CAPACITY: CPT

## 2019-01-01 RX ORDER — ATORVASTATIN CALCIUM 40 MG/1
40 TABLET, FILM COATED ORAL EVERY EVENING
COMMUNITY
End: 2019-01-01

## 2019-01-01 RX ORDER — RANOLAZINE 500 MG/1
500 TABLET, EXTENDED RELEASE ORAL 2 TIMES DAILY
Qty: 60 TABLET | Refills: 11 | Status: SHIPPED | OUTPATIENT
Start: 2019-01-01 | End: 2019-01-01

## 2019-01-01 RX ORDER — FUROSEMIDE 40 MG
60 TABLET ORAL DAILY
Qty: 135 TABLET | Refills: 3 | Status: SHIPPED | OUTPATIENT
Start: 2019-01-01 | End: 2019-01-01

## 2019-01-01 RX ORDER — NITROGLYCERIN 0.4 MG/1
0.4 TABLET SUBLINGUAL EVERY 5 MIN PRN
COMMUNITY
Start: 2016-03-24 | End: 2019-01-01

## 2019-01-01 RX ORDER — OXYCODONE HYDROCHLORIDE 5 MG/1
5-10 TABLET ORAL EVERY 4 HOURS PRN
Qty: 93 TABLET | Refills: 0 | Status: SHIPPED | OUTPATIENT
Start: 2019-01-01 | End: 2019-01-01

## 2019-01-01 RX ORDER — WARFARIN SODIUM 1 MG/1
1 TABLET ORAL SEE ADMIN INSTRUCTIONS
Start: 2019-01-01

## 2019-01-01 RX ORDER — VENLAFAXINE HYDROCHLORIDE 37.5 MG/1
37.5 TABLET, EXTENDED RELEASE ORAL DAILY
Start: 2019-01-01

## 2019-01-01 RX ORDER — FERROUS SULFATE 325(65) MG
325 TABLET, DELAYED RELEASE (ENTERIC COATED) ORAL DAILY
Start: 2019-01-01

## 2019-01-01 RX ORDER — CITALOPRAM HYDROBROMIDE 40 MG/1
40 TABLET ORAL DAILY
COMMUNITY
End: 2019-01-01

## 2019-01-01 RX ORDER — FUROSEMIDE 40 MG
40 TABLET ORAL DAILY
Qty: 90 TABLET | Refills: 1 | Status: SHIPPED | OUTPATIENT
Start: 2019-01-01

## 2019-01-01 RX ORDER — OXYCODONE HYDROCHLORIDE 5 MG/1
5-10 TABLET ORAL EVERY 4 HOURS PRN
Qty: 90 TABLET | Refills: 0 | Status: SHIPPED | OUTPATIENT
Start: 2019-01-01 | End: 2019-01-01

## 2019-01-01 RX ORDER — GABAPENTIN 400 MG/1
400 CAPSULE ORAL 2 TIMES DAILY
COMMUNITY
End: 2019-01-01

## 2019-01-01 RX ORDER — CLOPIDOGREL BISULFATE 75 MG/1
75 TABLET ORAL DAILY
COMMUNITY
End: 2019-01-01

## 2019-01-01 RX ORDER — RANOLAZINE 500 MG/1
500 TABLET, EXTENDED RELEASE ORAL 2 TIMES DAILY
Start: 2019-01-01

## 2019-01-01 RX ORDER — ACETAMINOPHEN 325 MG/1
650 TABLET ORAL 3 TIMES DAILY
Start: 2019-01-01

## 2019-01-01 RX ORDER — CITALOPRAM HYDROBROMIDE 40 MG/1
40 TABLET ORAL DAILY
Qty: 90 TABLET | Refills: 3 | Status: SHIPPED | OUTPATIENT
Start: 2019-01-01 | End: 2019-01-01

## 2019-01-01 RX ORDER — AMLODIPINE BESYLATE 2.5 MG/1
2.5 TABLET ORAL DAILY
Qty: 90 TABLET | Refills: 3 | Status: SHIPPED | OUTPATIENT
Start: 2019-01-01 | End: 2019-01-01

## 2019-01-01 RX ORDER — LEVETIRACETAM 500 MG/1
500 TABLET ORAL 2 TIMES DAILY
Start: 2019-01-01

## 2019-01-01 RX ORDER — HYDROCODONE BITARTRATE AND ACETAMINOPHEN 5; 325 MG/1; MG/1
1 TABLET ORAL EVERY 6 HOURS PRN
Qty: 7 TABLET | Refills: 0 | Status: SHIPPED | OUTPATIENT
Start: 2019-01-01 | End: 2019-01-01

## 2019-01-01 RX ORDER — AMIODARONE HYDROCHLORIDE 200 MG/1
100 TABLET ORAL DAILY
Qty: 45 TABLET | Refills: 3 | Status: SHIPPED | OUTPATIENT
Start: 2019-01-01

## 2019-01-01 RX ORDER — IOPAMIDOL 755 MG/ML
100 INJECTION, SOLUTION INTRAVASCULAR ONCE
Status: COMPLETED | OUTPATIENT
Start: 2019-01-01 | End: 2019-01-01

## 2019-01-01 RX ORDER — FUROSEMIDE 40 MG
40 TABLET ORAL DAILY
Qty: 90 TABLET | Refills: 3 | Status: SHIPPED | OUTPATIENT
Start: 2019-01-01 | End: 2019-01-01

## 2019-01-01 RX ORDER — LANOLIN ALCOHOL/MO/W.PET/CERES
6 CREAM (GRAM) TOPICAL AT BEDTIME
Start: 2019-01-01

## 2019-01-01 RX ORDER — AMIODARONE HYDROCHLORIDE 200 MG/1
100 TABLET ORAL DAILY
COMMUNITY
Start: 2016-03-24 | End: 2019-01-01

## 2019-01-01 RX ORDER — POLYETHYLENE GLYCOL 3350 17 G/17G
17 POWDER, FOR SOLUTION ORAL DAILY
Start: 2019-01-01

## 2019-01-01 RX ORDER — NITROGLYCERIN 0.4 MG/1
0.4 TABLET SUBLINGUAL EVERY 5 MIN PRN
Qty: 25 TABLET | Refills: 6 | Status: SHIPPED | OUTPATIENT
Start: 2019-01-01

## 2019-01-01 RX ORDER — ASPIRIN 81 MG/1
81 TABLET, CHEWABLE ORAL DAILY
Start: 2019-01-01

## 2019-01-01 RX ORDER — CALCITONIN SALMON 200 [IU]/.09ML
1 SPRAY, METERED NASAL DAILY
Qty: 1 BOTTLE | Refills: 11 | Status: SHIPPED | OUTPATIENT
Start: 2019-01-01

## 2019-01-01 RX ORDER — OXYCODONE HYDROCHLORIDE 5 MG/1
2.5 TABLET ORAL EVERY 6 HOURS PRN
Qty: 12 TABLET | Refills: 0
Start: 2019-01-01 | End: 2019-01-01

## 2019-01-01 RX ORDER — OXYCODONE HYDROCHLORIDE 5 MG/1
TABLET ORAL
Qty: 30 TABLET | Refills: 0 | Status: SHIPPED | OUTPATIENT
Start: 2019-01-01

## 2019-01-01 RX ORDER — AMLODIPINE BESYLATE 2.5 MG/1
2.5 TABLET ORAL DAILY
COMMUNITY
Start: 2016-03-24 | End: 2019-01-01

## 2019-01-01 RX ORDER — GABAPENTIN 400 MG/1
400 CAPSULE ORAL 2 TIMES DAILY
Qty: 180 CAPSULE | Refills: 3 | Status: SHIPPED | OUTPATIENT
Start: 2019-01-01

## 2019-01-01 RX ORDER — DOCUSATE SODIUM 100 MG/1
200 CAPSULE, LIQUID FILLED ORAL DAILY PRN
COMMUNITY

## 2019-01-01 RX ORDER — AMLODIPINE BESYLATE 2.5 MG/1
2.5 TABLET ORAL DAILY
Start: 2019-01-01

## 2019-01-01 RX ORDER — FENTANYL CITRATE 50 UG/ML
25 INJECTION, SOLUTION INTRAMUSCULAR; INTRAVENOUS
Status: COMPLETED | OUTPATIENT
Start: 2019-01-01 | End: 2019-01-01

## 2019-01-01 RX ORDER — CITALOPRAM HYDROBROMIDE 20 MG/1
20 TABLET ORAL SEE ADMIN INSTRUCTIONS
Start: 2019-01-01

## 2019-01-01 RX ORDER — CEFTRIAXONE 1 G/1
2000 INJECTION, POWDER, FOR SOLUTION INTRAMUSCULAR; INTRAVENOUS EVERY 24 HOURS
Qty: 20 ML | Refills: 0
Start: 2019-01-01

## 2019-01-01 RX ORDER — SODIUM CHLORIDE, SODIUM LACTATE, POTASSIUM CHLORIDE, CALCIUM CHLORIDE 600; 310; 30; 20 MG/100ML; MG/100ML; MG/100ML; MG/100ML
1000 INJECTION, SOLUTION INTRAVENOUS CONTINUOUS
Status: DISCONTINUED | OUTPATIENT
Start: 2019-01-01 | End: 2019-01-01 | Stop reason: HOSPADM

## 2019-01-01 RX ORDER — FUROSEMIDE 40 MG
40 TABLET ORAL DAILY
COMMUNITY
End: 2019-01-01

## 2019-01-01 RX ORDER — OXYCODONE HYDROCHLORIDE 5 MG/1
5-10 TABLET ORAL EVERY 4 HOURS PRN
COMMUNITY
Start: 2018-01-22 | End: 2019-01-01

## 2019-01-01 RX ORDER — CALCITONIN SALMON 200 [IU]/.09ML
1 SPRAY, METERED NASAL DAILY
COMMUNITY
End: 2019-01-01

## 2019-01-01 RX ORDER — ONDANSETRON 2 MG/ML
4 INJECTION INTRAMUSCULAR; INTRAVENOUS
Status: COMPLETED | OUTPATIENT
Start: 2019-01-01 | End: 2019-01-01

## 2019-01-01 RX ORDER — VANCOMYCIN HYDROCHLORIDE 125 MG/1
125 CAPSULE ORAL 4 TIMES DAILY
Qty: 56 CAPSULE | Refills: 0 | Status: SHIPPED | OUTPATIENT
Start: 2019-01-01 | End: 2019-01-01

## 2019-01-01 RX ORDER — ATORVASTATIN CALCIUM 40 MG/1
40 TABLET, FILM COATED ORAL EVERY EVENING
Qty: 90 TABLET | Refills: 3 | Status: SHIPPED | OUTPATIENT
Start: 2019-01-01

## 2019-01-01 RX ORDER — CITALOPRAM HYDROBROMIDE 40 MG/1
40 TABLET ORAL DAILY
Qty: 90 TABLET | Refills: 3 | Status: CANCELLED | OUTPATIENT
Start: 2019-01-01

## 2019-01-01 RX ORDER — CLOPIDOGREL BISULFATE 75 MG/1
75 TABLET ORAL DAILY
Qty: 90 TABLET | Refills: 3 | Status: SHIPPED | OUTPATIENT
Start: 2019-01-01 | End: 2019-01-01

## 2019-01-01 RX ADMIN — IOPAMIDOL 100 ML: 755 INJECTION, SOLUTION INTRAVENOUS at 10:25

## 2019-01-01 RX ADMIN — SODIUM CHLORIDE 100 ML: 9 INJECTION, SOLUTION INTRAVENOUS at 10:25

## 2019-01-01 RX ADMIN — SODIUM CHLORIDE, POTASSIUM CHLORIDE, SODIUM LACTATE AND CALCIUM CHLORIDE 1000 ML: 600; 310; 30; 20 INJECTION, SOLUTION INTRAVENOUS at 13:34

## 2019-01-01 RX ADMIN — SODIUM CHLORIDE, POTASSIUM CHLORIDE, SODIUM LACTATE AND CALCIUM CHLORIDE 500 ML: 600; 310; 30; 20 INJECTION, SOLUTION INTRAVENOUS at 09:26

## 2019-01-01 RX ADMIN — SODIUM CHLORIDE, POTASSIUM CHLORIDE, SODIUM LACTATE AND CALCIUM CHLORIDE 1000 ML: 600; 310; 30; 20 INJECTION, SOLUTION INTRAVENOUS at 12:36

## 2019-01-01 RX ADMIN — FENTANYL CITRATE 25 MCG: 50 INJECTION, SOLUTION INTRAMUSCULAR; INTRAVENOUS at 09:25

## 2019-01-01 RX ADMIN — ONDANSETRON 4 MG: 2 INJECTION INTRAMUSCULAR; INTRAVENOUS at 09:23

## 2019-01-01 RX ADMIN — FENTANYL CITRATE 25 MCG: 50 INJECTION, SOLUTION INTRAMUSCULAR; INTRAVENOUS at 10:08

## 2019-01-01 SDOH — HEALTH STABILITY: MENTAL HEALTH: HOW OFTEN DO YOU HAVE A DRINK CONTAINING ALCOHOL?: NEVER

## 2019-01-01 ASSESSMENT — ENCOUNTER SYMPTOMS
ALLERGIC/IMMUNOLOGIC NEGATIVE: 1
GASTROINTESTINAL NEGATIVE: 1
EYES NEGATIVE: 1
ENDOCRINE NEGATIVE: 1
CONSTITUTIONAL NEGATIVE: 1
MUSCULOSKELETAL NEGATIVE: 1
NEUROLOGICAL NEGATIVE: 1
HEMATOLOGIC/LYMPHATIC NEGATIVE: 1
CHEST TIGHTNESS: 1
PSYCHIATRIC NEGATIVE: 1

## 2019-01-01 ASSESSMENT — MIFFLIN-ST. JEOR
SCORE: 1695.55
SCORE: 1688.75
SCORE: 1711.43
SCORE: 1693.29
SCORE: 1711.43

## 2019-01-01 ASSESSMENT — PATIENT HEALTH QUESTIONNAIRE - PHQ9: SUM OF ALL RESPONSES TO PHQ QUESTIONS 1-9: 22

## 2019-01-26 NOTE — DISCHARGE INSTRUCTIONS
1) exact cause of your symptoms are not clear.  We have discussed admission to the hospital for care versus going home. You have expressed preference for going home.  2) pain and discomfort seems to be improved after dose of IV pain medication.  You have requested a refill pending follow-up care as you have an appointment on February 1, 2019 in [facility].  3) clinic appointment was made for you at your request with desire [facility] because Dr. Crowley your primary care provider at [facility] has retired.an appointment was made for 1/30.19 for you to establish primary care here at [facility].

## 2019-01-26 NOTE — ED PROVIDER NOTES
History     Chief Complaint   Patient presents with     Generalized Weakness     started 4 days ago with cough and feeling weak. no chest pain or SOA     Cough     Nausea     no vomiting.       HPI  Harlan Allen is a 83 year old male who presents to the ED for evaluation of chest discomfort with influenza-like symptoms and generalized weakness. History is obtained through the patient's wife. Four days ago, the patient and his wife developed influenza-like symptoms after exposure to the influenza virus through their daughter. The patient then reportedly developed chest discomfort yesterday but refused to be seen. This morning, the patient seemed noticeably weaker with worsened discomfort in his mid-sternal chest region. The patient acknowledges increased discomfort with palpation to the region. The patient s wife notes that he seems to be getting weaker and is not drinking enough fluids. The patient adds that he does not feel well and would like to go home. He was given an aspirin this morning in conjunction with his daily medications except furosemide. The patient has a history of coronary artery disease status post CABG in 2004 and four stents placed at [facility] prior to that. The patient also has a history of severe rheumatoid arthritis and osteoarthritis, and had a cervical spine fusion in 2014. The patient has drug allergies to hydroxychloroquine and omeprazole. He is currently followed by [facility].     Social History: The patient lives in [place], [place]. He presents to the ED with his wife via private car.       Allergies:  Allergies   Allergen Reactions     Hydroxychloroquine Rash     Omeprazole Rash     Pt had plain aspirin and reacted by developing many ulcers down esophagus. By Upper GI test they found the ulcers. Pt is ok to take low grade ecotrin without reaction.       Problem List:    There are no active problems to display for this patient.       Past Medical History:     History reviewed. No pertinent past medical history.    Past Surgical History:    History reviewed. No pertinent surgical history.    Family History:    No family history on file.    Social History:  Marital Status:   [2]  Social History     Tobacco Use     Smoking status: Not on file   Substance Use Topics     Alcohol use: Not on file     Drug use: Not on file        Medications:      Acetaminophen (TYLENOL 8 HOUR ARTHRITIS PAIN PO)   amiodarone (PACERONE/CODARONE) 200 MG tablet   amLODIPine (NORVASC) 2.5 MG tablet   aspirin (ASA) 81 MG EC tablet   atorvastatin (LIPITOR) 40 MG tablet   calcitonin, salmon, (MIACALCIN) 200 UNIT/ACT nasal spray   calcium carbonate-vitamin D (CALCIUM 500/D) 500-200 MG-UNIT tablet   citalopram (CELEXA) 40 MG tablet   clopidogrel (PLAVIX) 75 MG tablet   docusate sodium (COLACE) 100 MG capsule   furosemide (LASIX) 40 MG tablet   gabapentin (NEURONTIN) 400 MG capsule   HYDROcodone-acetaminophen (NORCO) 5-325 MG tablet   oxyCODONE (ROXICODONE) 5 MG tablet   ranitidine (ZANTAC) 150 MG tablet   nitroGLYcerin (NITROSTAT) 0.4 MG sublingual tablet       Review of Systems   Constitutional: Negative.    HENT: Negative.    Eyes: Negative.    Respiratory: Positive for chest tightness.    Cardiovascular: Positive for chest pain.   Gastrointestinal: Negative.    Endocrine: Negative.    Genitourinary: Negative.    Musculoskeletal: Negative.    Skin: Negative.    Allergic/Immunologic: Negative.    Neurological: Negative.    Hematological: Negative.    Psychiatric/Behavioral: Negative.    All other systems reviewed and are negative.      Physical Exam   BP: 162/84  Pulse: 73  Heart Rate: 72  Temp: 97  F (36.1  C)  Resp: 20  Weight: 95.3 kg (210 lb)  SpO2: 99 %      Physical Exam   Constitutional: He is oriented to person, place, and time. He appears well-developed. He appears distressed.   HENT:   Head: Normocephalic and atraumatic.   Eyes: EOM are normal. Pupils are equal, round, and reactive  to light. Right eye exhibits no discharge. Left eye exhibits no discharge. No scleral icterus.   Neck: Normal range of motion. Neck supple. No JVD present. No tracheal deviation present. No thyromegaly present.   Cardiovascular: Normal rate. Exam reveals no gallop and no friction rub.   No murmur heard.  Pulmonary/Chest: Effort normal and breath sounds normal. No stridor. No respiratory distress. He has no wheezes. He has no rales. He exhibits no tenderness.   Abdominal: Soft. Bowel sounds are normal. He exhibits no distension and no mass. There is no tenderness. There is no rebound and no guarding. No hernia.   Lymphadenopathy:     He has no cervical adenopathy.   Neurological: He is alert and oriented to person, place, and time. He displays normal reflexes. No cranial nerve deficit or sensory deficit. He exhibits normal muscle tone. Coordination normal.   Skin: Capillary refill takes less than 2 seconds.   Psychiatric: He has a normal mood and affect. His behavior is normal. Judgment and thought content normal.       ED Course        Procedures               EKG Interpretation:      Interpreted by Issa Ty  Time reviewed:08:55AM  Symptoms at time of EKG: Substernal chest pain  Rhythm: atrial fibrillation - controlled  Rate: Normal  Axis: Normal  Ectopy: none  Conduction: normal  ST Segments/ T Waves: Wavy baseline, T wave inversion in lead III, ST changes in V2.  Q Waves: nonspecific  Comparison to prior: No old EKG available    Clinical Impression: Wavy baseline, proximal atrial fibrillation, T wave changes in inferior and septal leads      Critical Care time:  none             ED Medications:  Medications   lactated ringers BOLUS 500 mL (0 mLs Intravenous Stopped 1/26/19 1120)   fentaNYL (PF) (SUBLIMAZE) injection 25 mcg (25 mcg Intravenous Given 1/26/19 0960)   ondansetron (ZOFRAN) injection 4 mg (4 mg Intravenous Given 1/26/19 0967)   fentaNYL (PF) (SUBLIMAZE) injection 25 mcg (25 mcg Intravenous  Given 1/26/19 1008)   iopamidol (ISOVUE-370) solution 100 mL (100 mLs Intravenous Given 1/26/19 1025)   sodium chloride 0.9 % bag 500mL for CT scan flush use (100 mLs Intravenous Given 1/26/19 1025)       ED Vitals:  Vitals:    01/26/19 1300 01/26/19 1315 01/26/19 1330 01/26/19 1345   BP: 123/87 105/77 (!) 155/92 155/77   Pulse: 72 67 73 73   Resp: 10 13 16 (!) 6   Temp:       TempSrc:       SpO2: 98% 96% 95% 98%   Weight:           ED Labs and Imaging:  Results for orders placed or performed during the hospital encounter of 01/26/19 (from the past 24 hour(s))   CBC with platelets differential   Result Value Ref Range    WBC 4.1 4.0 - 11.0 10e9/L    RBC Count 3.55 (L) 4.4 - 5.9 10e12/L    Hemoglobin 11.8 (L) 13.3 - 17.7 g/dL    Hematocrit 35.6 (L) 40.0 - 53.0 %     78 - 100 fl    MCH 33.2 (H) 26.5 - 33.0 pg    MCHC 33.1 31.5 - 36.5 g/dL    RDW 13.5 10.0 - 15.0 %    Platelet Count 152 150 - 450 10e9/L    Diff Method Automated Method     % Neutrophils 53.1 %    % Lymphocytes 32.7 %    % Monocytes 7.6 %    % Eosinophils 5.4 %    % Basophils 1.0 %    % Immature Granulocytes 0.2 %    Nucleated RBCs 0 0 /100    Absolute Neutrophil 2.2 1.6 - 8.3 10e9/L    Absolute Lymphocytes 1.3 0.8 - 5.3 10e9/L    Absolute Monocytes 0.3 0.0 - 1.3 10e9/L    Absolute Eosinophils 0.2 0.0 - 0.7 10e9/L    Absolute Basophils 0.0 0.0 - 0.2 10e9/L    Abs Immature Granulocytes 0.0 0 - 0.4 10e9/L    Absolute Nucleated RBC 0.0    Comprehensive metabolic panel   Result Value Ref Range    Sodium 143 133 - 144 mmol/L    Potassium 4.5 3.4 - 5.3 mmol/L    Chloride 111 (H) 94 - 109 mmol/L    Carbon Dioxide 25 20 - 32 mmol/L    Anion Gap 7 3 - 14 mmol/L    Glucose 84 70 - 99 mg/dL    Urea Nitrogen 17 7 - 30 mg/dL    Creatinine 1.43 (H) 0.66 - 1.25 mg/dL    GFR Estimate 45 (L) >60 mL/min/[1.73_m2]    GFR Estimate If Black 53 (L) >60 mL/min/[1.73_m2]    Calcium 9.1 8.5 - 10.1 mg/dL    Bilirubin Total 0.8 0.2 - 1.3 mg/dL    Albumin 3.9 3.4 - 5.0 g/dL     Protein Total 7.2 6.8 - 8.8 g/dL    Alkaline Phosphatase 170 (H) 40 - 150 U/L    ALT 13 0 - 70 U/L    AST 13 0 - 45 U/L   Troponin I   Result Value Ref Range    Troponin I ES 0.023 0.000 - 0.045 ug/L   Chest XR,  PA & LAT    Narrative    CHEST TWO VIEWS January 26, 2019 9:48 AM     HISTORY: Substernal chest pain, four day history of cough, history of  coronary disease with coronary artery bypass graft stents. Evaluate  for acute cardiothoracic process.    COMPARISON: 7/24/2006.      Impression    IMPRESSION: No active disease. Previous coronary artery bypass graft  surgery has been performed. There has been interval removal of  majority of sternotomy wires.    FAUSTINA CRANE MD   CT Abdomen Pelvis w Contrast    Narrative    CT ABDOMEN/PELVIS WITH CONTRAST January 26, 2019 10:35 AM     HISTORY: Abdomen pain, unspecified. Four day history of generalized  weakness, nausea, cough, substernal chest pain, evaluate for  intra-abdominal catastrophe, including ischemia, colitis, obstruction  versus other acute process.    TECHNIQUE: 100 ml isovue 370. Radiation dose for this scan was reduced  using automated exposure control, adjustment of the mA and/or kV  according to patient size, or iterative reconstruction technique.    COMPARISON: None.    FINDINGS: There are trace bilateral pleural effusions, left greater  than right. Mild bibasilar atelectasis. Probable small calcified  granuloma in the right lateral lung base.    There is fatty infiltration of the liver. The spleen, pancreas, and  adrenal glands are unremarkable. Small low-attenuation lesions in each  kidney, too small to characterize. There are atherosclerotic changes  in the aorta. No evidence of aneurysm. The large and small bowel are  normal in caliber. No definite bowel wall thickening is seen. There  are scattered colonic diverticula but no evidence of acute  diverticulitis. There is an unusual fluid density structure in the  right lower quadrant seen on  images 69-74. This is lateral to the  sigmoid colon and does not definitively connect with bowel. There are  no surrounding inflammatory changes. There is a small amount of free  fluid in the pelvis. No free intraperitoneal air. There is fixation  hardware in the right hip which causes beam hardening artifact in the  pelvis. Multilevel degenerative change in the spine. There is an old  healed fracture of the right 11th rib.      Impression    IMPRESSION:   1. Trace bilateral pleural effusions.  2. Fatty liver.  3. Small amount of free fluid in the pelvis.  4. Fluid containing structure in the right lower quadrant as  described. This is of uncertain etiology. It may represent a large  diverticulum. Consider a contrast enema for further evaluation.    MATTHEW TUBBS MD   Troponin I (second draw)   Result Value Ref Range    Troponin I ES 0.023 0.000 - 0.045 ug/L       9:02 AM Patient assessed.    Assessments & Plan (with Medical Decision Making)   Clinical Impression: 83-year-old male who arrived by private car with his wife for constellation of symptoms including generalized weakness, nausea, cough, and substernal chest pain and discomfort.  Exact cause of his symptoms is unclear.    History obtained from the wife who is at the bedside and limited from the patient's due to discomfort.  Wife reports family with recent flulike symptoms.  Patient  complained of substernal chest discomfort.  He has a history of coronary disease and had a stent and CABG about 15 years ago at [facility].  Care primarily at [facility]. Lives in [place], [place].  Patient reports he feels unwell.  I was summoned to the bedside for immediate evaluation due to report of substernal chest discomfort with known history of coronary disease.  On my exam he appeared uncomfortable in no acute distress he was not diaphoretic.  He appeared pale.  He had a 3+ systolic murmur lungs were clear to auscultation with symmetric chest rise no  rubs or gallops.  Pain was reproducible with palpation on the sternum and substernal area.  No rash was noted.  EKG on the ED arrival showed poor R wave progression with the baseline can not exclude rate controlled atrial fibrillation no acute ischemia was noted however T wave changes over right V2 and lead III.  No viewable EKG on file for comparison.  EKG obtained from outside records from [facility] showed history of sinus bradycardia with first-degree AV block with T wave changes noted today unchanged from T wave changes noted on prior EKG available on paper copy.      ED Course and Plan:   With patient's age and underlying history of coronary disease and report of substernal chest discomfort he was monitored on telemetry and serial EKGs were obtained.  He received aspirin prior to ED arrival.  He was offered fentanyl for pain and discomfort he was given fluids blood work was obtained.  X-ray imaging of the chest was also obtained with report of flulike symptoms and recent exposure to flulike illness.  Arrival troponin in the emergency department was 0.023.  Troponin was repeated.  XR chest did not show any acute process on my independent review.  Please see the interpreting radialis report above.  Patient had marked relief with his pain and discomfort after dose of IV fentanyl.  With persistent complaint of substernal chest discomfort and imaging of his chest and abdomen was obtained to exclude acute process.  Spouse inquired if his pain and discomfort is related to the fact that he has been on out of oxycodone for 5 days and has chronic arthritis pain.    I was able to review outside records provided from [facility].,  Patient is on amlodipine, amiodarone, atorvastatin, calcitonin, seen citalopram, clopidogrel, furosemide gabapentin and Percocet as needed.  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    CT imaging today showed multiple incidental findings including trace bilateral pleural effusion.  There is a small amount of free fluid in the pelvis and right lower quadrant fluid filled structure which was suspicious for a large diverticulum per the interpreting radiologist-see detailed report above.  Patient did not report any lower abdominal pain hence additional imaging with contrast as suggested by the interpreting radiologist was not pursued during his course of care in the emergency department.    After period of observation care his delta troponin does not show any interval change.  He had relief after IV fentanyl.  He was eager to be discharged from the emergency department.  We reviewed options for care given his age and comorbidities.  I offered to admit him for observation patient will prefer to go home.  He reports his primary care provider Dr. Crowley in [place] retired and he is scheduled to see a new provider in [facility] on February 1, 2019.  He is currently out of oxycodone which he has been taking for chronic pain related to arthritis.    I was able to make a new patient visit/ appointment (on 1/30/19 with Dr Cox) for the patient in clinic here at [facility] as patient and spouse report they would prefer to transfer care to [facility] from [facility]..  He was given 7 tablets of Norco 5/325mg.           Disclaimer: This note consists of symbols derived from keyboarding, dictation and/or voice recognition software. As a result, there may be errors in the script that have gone undetected. Please consider this when interpreting information found in this chart.      I have reviewed the nursing notes.    I have reviewed the findings, diagnosis, plan and need for follow up with the patient.          Medication List      Started    HYDROcodone-acetaminophen 5-325 MG tablet  Commonly known as:   NORCO  1 tablet, Oral, EVERY 6 HOURS PRN            Final diagnoses:   Other chronic pain - History of rheumatoid arthritis   Chest discomfort - Over the last 4-5 days       This document serves as a record of the services and decisions personally performed and made by Issa Ty. The HPI was created on his behalf by Belinda Navarrete, a trained medical scribe. The creation of this document is based the provider's statements to the medical scribe.  Belinda Navarrete 11:44 AM 1/26/2019    Provider:   The information in this document, created by the medical scribe for me, accurately reflects the services I personally performed and the decisions made by me. I have reviewed and approved this document for accuracy prior to leaving the patient care area.  Issa Ty, 11:44 AM 1/26/2019 1/26/2019   [facility] EMERGENCY DEPARTMENT     Issa Ty MD  01/26/19 1711

## 2019-01-26 NOTE — ED AVS SNAPSHOT
[facility] Emergency Department  [address] [address]  Phone:  [phone]  Fax:  [phone]                                    Harlan Allen   MRN: [id number]    Department:  [facility] Emergency Department   Date of Visit:  1/26/2019           After Visit Summary Signature Page    I have received my discharge instructions, and my questions have been answered. I have discussed any challenges I see with this plan with the nurse or doctor.    ..........................................................................................................................................  Patient/Patient Representative Signature      ..........................................................................................................................................  Patient Representative Print Name and Relationship to Patient    ..................................................               ................................................  Date                                   Time    ..........................................................................................................................................  Reviewed by Signature/Title    ...................................................              ..............................................  Date                                               Time          22EPIC Rev 08/18

## 2019-01-30 NOTE — PROGRESS NOTES
SUBJECTIVE:   Harlan Allen is a 83 year old male who presents to clinic today for the following health issues:      ED/UC Followup:    Facility:  [facility]  Date of visit: 1/26/19  Reason for visit: chest discomfort and chronic pain  ER note below:   Harlan Allen is a 83 year old male who presents to the ED for evaluation of chest discomfort with influenza-like symptoms and generalized weakness. History is obtained through the patient's wife. Four days ago, the patient and his wife developed influenza-like symptoms after exposure to the influenza virus through their daughter. The patient then reportedly developed chest discomfort yesterday but refused to be seen. This morning, the patient seemed noticeably weaker with worsened discomfort in his mid-sternal chest region. The patient acknowledges increased discomfort with palpation to the region. The patient s wife notes that he seems to be getting weaker and is not drinking enough fluids. The patient adds that he does not feel well and would like to go home. He was given an aspirin this morning in conjunction with his daily medications except furosemide. The patient has a history of coronary artery disease status post CABG in 2004 and four stents placed at [facility] prior to that. The patient also has a history of severe rheumatoid arthritis and osteoarthritis, and had a cervical spine fusion in 2014. The patient has drug allergies to hydroxychloroquine and omeprazole. He is currently followed by [facility].    Current Status: feeling okay, has a lot of pain,      Establish care, transfer clinic care, needing multiple medication refilled         Current Outpatient Medications:      Acetaminophen (TYLENOL 8 HOUR ARTHRITIS PAIN PO), Take 650 mg by mouth every 8 hours as needed, Disp: , Rfl:      amiodarone (PACERONE/CODARONE) 200 MG tablet, Take 100 mg by mouth daily, Disp: , Rfl:      amLODIPine (NORVASC) 2.5 MG tablet, Take 2.5 mg by  mouth daily, Disp: , Rfl:      aspirin (ASA) 81 MG EC tablet, Take 81 mg by mouth daily, Disp: , Rfl:      atorvastatin (LIPITOR) 40 MG tablet, Take 40 mg by mouth every evening, Disp: , Rfl:      calcitonin, salmon, (MIACALCIN) 200 UNIT/ACT nasal spray, Spray 1 spray into one nostril alternating nostrils daily, Disp: , Rfl:      calcium carbonate-vitamin D (CALCIUM 500/D) 500-200 MG-UNIT tablet, Take 2 tablets by mouth daily, Disp: , Rfl:      citalopram (CELEXA) 40 MG tablet, Take 40 mg by mouth daily, Disp: , Rfl:      clopidogrel (PLAVIX) 75 MG tablet, Take 75 mg by mouth daily, Disp: , Rfl:      docusate sodium (COLACE) 100 MG capsule, Take 200 mg by mouth daily as needed for constipation, Disp: , Rfl:      furosemide (LASIX) 40 MG tablet, Take 40 mg by mouth daily, Disp: , Rfl:      gabapentin (NEURONTIN) 400 MG capsule, Take 400 mg by mouth 2 times daily, Disp: , Rfl:      HYDROcodone-acetaminophen (NORCO) 5-325 MG tablet, Take 1 tablet by mouth every 6 hours as needed for breakthrough pain or severe pain, Disp: 7 tablet, Rfl: 0     nitroGLYcerin (NITROSTAT) 0.4 MG sublingual tablet, Place 0.4 mg under the tongue every 5 minutes as needed, Disp: , Rfl:      oxyCODONE (ROXICODONE) 5 MG tablet, Take 5-10 mg by mouth every 4 hours as needed, Disp: , Rfl:      ranitidine (ZANTAC) 150 MG tablet, Take 150 mg by mouth daily, Disp: , Rfl:     There is no problem list on file for this patient.      Blood pressure 148/90, pulse 73, temperature 97.9  F (36.6  C), temperature source Tympanic, resp. rate 18, height 1.829 m (6'), weight 95.3 kg (210 lb), SpO2 98 %.    Exam:  GENERAL APPEARANCE: healthy, alert and no distress  EYES: EOMI,  PERRL  HENT: ear canals and TM's normal and nose and mouth without ulcers or lesions  NECK: no adenopathy, no asymmetry, masses, or scars and thyroid normal to palpation  RESP: lungs clear to auscultation - no rales, rhonchi or wheezes  CV: regular rates and rhythm, normal S1 S2, no S3 or  S4 and no murmur, click or rub -  MS: mild edema to the ,od shins bilaterally  SKIN: no suspicious lesions or rashes  PSYCH: mentation appears normal and affect normal/bright  LYMPHATICS: No axillary, cervical, inguinal, or supraclavicular nodes    Total time 45 minutes, greater than 50% in counseling    (I25.10) ASHD (arteriosclerotic heart disease)  (primary encounter diagnosis)  Comment:   Plan: amiodarone (PACERONE/CODARONE) 200 MG tablet,         clopidogrel (PLAVIX) 75 MG tablet,         nitroGLYcerin (NITROSTAT) 0.4 MG sublingual         tablet        Monitor for the symptoms of angina and use the nitroglycerin as needed.   Stay on the other meds. Exercise is recommended. See Cardiology at [phone].     (I10) Essential hypertension  Comment:   Plan: amLODIPine (NORVASC) 2.5 MG tablet        Monitor and record the BP at rest and the goal for the average is under 130/80.   Use the meds and the non drug therapies.     (E78.5) Hyperlipidemia LDL goal <100  Comment:   Plan: atorvastatin (LIPITOR) 40 MG tablet        Use the lower chol diet and daily exercise and refills are done on the med for one year.   Call our lab at [phone] for the lipids and fast for 8 hours.     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   If doing well then refill and recheck annually.     (R60.9) Peripheral edema  Comment:   Plan: furosemide (LASIX) 40 MG tablet        Use the med as discussed. And the labs were done.     (G62.9) Peripheral polyneuropathy  Comment:   Plan: gabapentin (NEURONTIN) 400 MG capsule        Use the med as needed and this is good for one year.     (M47.9) Osteoarthritis of spine, unspecified spinal osteoarthritis complication status, unspecified spinal region  Comment:   Plan: oxyCODONE (ROXICODONE) 5 MG tablet,        The written Rx's were done for 3 pills daily for the months dated today, 3-2-19 and  4-2-19. Recheck in about three months.   Use the non drug therapies.       Marky Cox

## 2019-01-30 NOTE — PATIENT INSTRUCTIONS
(I25.10) ASHD (arteriosclerotic heart disease)  (primary encounter diagnosis)  Comment:   Plan: amiodarone (PACERONE/CODARONE) 200 MG tablet,         clopidogrel (PLAVIX) 75 MG tablet,         nitroGLYcerin (NITROSTAT) 0.4 MG sublingual         tablet        Monitor for the symptoms of angina and use the nitroglycerin as needed.   Stay on the other meds. Exercise is recommended. See Cardiology at [phone].     (I10) Essential hypertension  Comment:   Plan: amLODIPine (NORVASC) 2.5 MG tablet        Monitor and record the BP at rest and the goal for the average is under 130/80.   Use the meds and the non drug therapies.     (E78.5) Hyperlipidemia LDL goal <100  Comment:   Plan: atorvastatin (LIPITOR) 40 MG tablet        Use the lower chol diet and daily exercise and refills are done on the med for one year.   Call our lab at [phone] for the lipids and fast for 8 hours.     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   If doing well then refill and recheck annually.     (R60.9) Peripheral edema  Comment:   Plan: furosemide (LASIX) 40 MG tablet        Use the med as discussed. And the labs were done.     (G62.9) Peripheral polyneuropathy  Comment:   Plan: gabapentin (NEURONTIN) 400 MG capsule        Use the med as needed and this is good for one year.     (M47.9) Osteoarthritis of spine, unspecified spinal osteoarthritis complication status, unspecified spinal region  Comment:   Plan: oxyCODONE (ROXICODONE) 5 MG tablet,        The written Rx's were done for 3 pills daily for the months dated today, 3-2-19 and 4-2-19. Recheck in about three months.   Use the non drug therapies.

## 2019-01-30 NOTE — TELEPHONE ENCOUNTER
We are continuing medications from his previous Cardiologist and PCP. He has been on these for a long time. He has an upcoming appt with Cardiology here.   Marky Cox

## 2019-01-30 NOTE — TELEPHONE ENCOUNTER
Writer did call back to [facility] and relayed the below message from Diaz Cox.    Pharmacist Nida stated understanding.    Michele Saenz RN

## 2019-01-30 NOTE — TELEPHONE ENCOUNTER
Please Advise, 40MG of CITALOPRAM is over the max dose for this patients age. Also, combo of CITALOPRAM and AMIODARONE = QT prolongation. Any concern for TORSADES? Thanks

## 2019-02-07 PROBLEM — I25.10 ASHD (ARTERIOSCLEROTIC HEART DISEASE): Status: ACTIVE | Noted: 2019-01-01

## 2019-02-07 PROBLEM — E78.5 HYPERLIPIDEMIA LDL GOAL <100: Status: ACTIVE | Noted: 2019-01-01

## 2019-02-07 PROBLEM — F32.1 MODERATE MAJOR DEPRESSION (H): Status: ACTIVE | Noted: 2019-01-01

## 2019-02-07 PROBLEM — I10 ESSENTIAL HYPERTENSION: Status: ACTIVE | Noted: 2019-01-01

## 2019-03-04 NOTE — PROGRESS NOTES
HPI and Plan:   See dictation [id number]    Orders Placed This Encounter   Procedures     ALT     N terminal pro BNP outpatient     Basic metabolic panel     Follow-Up with Electrophysiologist     Follow-Up with Cardiologist     Echocardiogram Complete     Pulmonary function test     No orders of the defined types were placed in this encounter.    There are no discontinued medications.      Encounter Diagnoses   Name Primary?     On amiodarone therapy Yes     ASHD (arteriosclerotic heart disease)      PVC's (premature ventricular contractions)        CURRENT MEDICATIONS:  Current Outpatient Medications   Medication Sig Dispense Refill     amiodarone (PACERONE/CODARONE) 200 MG tablet Take 0.5 tablets (100 mg) by mouth daily 45 tablet 3     amLODIPine (NORVASC) 2.5 MG tablet Take 1 tablet (2.5 mg) by mouth daily 90 tablet 3     aspirin (ASA) 81 MG EC tablet Take 81 mg by mouth daily       atorvastatin (LIPITOR) 40 MG tablet Take 1 tablet (40 mg) by mouth every evening 90 tablet 3     calcitonin, salmon, (MIACALCIN) 200 UNIT/ACT nasal spray Spray 1 spray into one nostril alternating nostrils daily 1 Bottle 11     calcium carbonate-vitamin D (CALCIUM 500/D) 500-200 MG-UNIT tablet Take 2 tablets by mouth daily       citalopram (CELEXA) 40 MG tablet Take 1 tablet (40 mg) by mouth daily 90 tablet 3     clopidogrel (PLAVIX) 75 MG tablet Take 1 tablet (75 mg) by mouth daily 90 tablet 3     docusate sodium (COLACE) 100 MG capsule Take 200 mg by mouth daily as needed for constipation       furosemide (LASIX) 40 MG tablet Take 1 tablet (40 mg) by mouth daily 90 tablet 3     gabapentin (NEURONTIN) 400 MG capsule Take 1 capsule (400 mg) by mouth 2 times daily 180 capsule 3     nitroGLYcerin (NITROSTAT) 0.4 MG sublingual tablet Place 1 tablet (0.4 mg) under the tongue every 5 minutes as needed 25 tablet 6     oxyCODONE (ROXICODONE) 5 MG tablet Take 1-2 tablets (5-10 mg) by mouth every 4 hours as needed Fill on 4-2-19. 90 tablet 0      ranitidine (ZANTAC) 150 MG tablet Take 150 mg by mouth daily       Acetaminophen (TYLENOL 8 HOUR ARTHRITIS PAIN PO) Take 650 mg by mouth every 8 hours as needed         ALLERGIES     Allergies   Allergen Reactions     Hydroxychloroquine Rash     Omeprazole Rash     Pt had plain aspirin and reacted by developing many ulcers down esophagus. By Upper GI test they found the ulcers. Pt is ok to take low grade ecotrin without reaction.       PAST MEDICAL HISTORY:  No past medical history on file.    PAST SURGICAL HISTORY:  No past surgical history on file.    FAMILY HISTORY:  No family history on file.    SOCIAL HISTORY:  Social History     Socioeconomic History     Marital status:      Spouse name: None     Number of children: None     Years of education: None     Highest education level: None   Occupational History     None   Social Needs     Financial resource strain: None     Food insecurity:     Worry: None     Inability: None     Transportation needs:     Medical: None     Non-medical: None   Tobacco Use     Smoking status: Former Smoker     Last attempt to quit: 1952     Years since quittin.2     Smokeless tobacco: Former User   Substance and Sexual Activity     Alcohol use: No     Frequency: Never     Drug use: No     Sexual activity: None   Lifestyle     Physical activity:     Days per week: None     Minutes per session: None     Stress: None   Relationships     Social connections:     Talks on phone: None     Gets together: None     Attends Ep[employer] service: None     Active member of club or organization: None     Attends meetings of clubs or organizations: None     Relationship status: None     Intimate partner violence:     Fear of current or ex partner: None     Emotionally abused: None     Physically abused: None     Forced sexual activity: None   Other Topics Concern     None   Social History Narrative     None       Review of Systems:  Skin:  Negative     Eyes:  Negative    ENT:  Negative     Respiratory:  Positive for shortness of breath  Cardiovascular:   negative for chest pain;Positive for;edema;fatigue;lightheadedness;dizziness  Gastroenterology: Negative    Genitourinary:  Negative    Musculoskeletal:  Negative    Neurologic:  Positive for numbness or tingling of hands;numbness or tingling of feet;headaches;memory problems;stroke  Psychiatric:  Positive for anxiety;depression  Heme/Lymph/Imm:  Negative    Endocrine:  Negative      Physical Exam:  Vitals: /73   Pulse 66   Wt 95.3 kg (210 lb)   SpO2 94%   BMI 28.48 kg/m      Recent Lab Results:  LIPID RESULTS:  Lab Results   Component Value Date    CHOL 132 03/04/2019    HDL 43 03/04/2019    LDL 64 03/04/2019    TRIG 126 03/04/2019       LIVER ENZYME RESULTS:  Lab Results   Component Value Date    AST 17 03/04/2019    ALT 20 03/04/2019       CBC RESULTS:  Lab Results   Component Value Date    WBC 4.1 01/26/2019    RBC 3.55 (L) 01/26/2019    HGB 11.8 (L) 01/26/2019    HCT 35.6 (L) 01/26/2019     01/26/2019    MCH 33.2 (H) 01/26/2019    MCHC 33.1 01/26/2019    RDW 13.5 01/26/2019     01/26/2019       BMP RESULTS:  Lab Results   Component Value Date     01/26/2019    POTASSIUM 4.5 01/26/2019    CHLORIDE 111 (H) 01/26/2019    CO2 25 01/26/2019    ANIONGAP 7 01/26/2019    GLC 84 01/26/2019    BUN 17 01/26/2019    CR 1.43 (H) 01/26/2019    GFRESTIMATED 45 (L) 01/26/2019    GFRESTBLACK 53 (L) 01/26/2019    SAI 9.1 01/26/2019        A1C RESULTS:  No results found for: A1C    INR RESULTS:  Lab Results   Component Value Date    INR 1.04 07/24/2006           CC  Marky Cox MD  [address] [place] [address]

## 2019-03-04 NOTE — LETTER
3/4/2019    Marky Cox MD  [address] [address]    RE: Harlan Allen       Dear Colleague,    I had the pleasure of seeing Harlan Allen in the [facility].    HPI and Plan:   See dictation [id number]    Orders Placed This Encounter   Procedures     ALT     N terminal pro BNP outpatient     Basic metabolic panel     Follow-Up with Electrophysiologist     Follow-Up with Cardiologist     Echocardiogram Complete     Pulmonary function test     No orders of the defined types were placed in this encounter.    There are no discontinued medications.      Encounter Diagnoses   Name Primary?     On amiodarone therapy Yes     ASHD (arteriosclerotic heart disease)      PVC's (premature ventricular contractions)        CURRENT MEDICATIONS:  Current Outpatient Medications   Medication Sig Dispense Refill     amiodarone (PACERONE/CODARONE) 200 MG tablet Take 0.5 tablets (100 mg) by mouth daily 45 tablet 3     amLODIPine (NORVASC) 2.5 MG tablet Take 1 tablet (2.5 mg) by mouth daily 90 tablet 3     aspirin (ASA) 81 MG EC tablet Take 81 mg by mouth daily       atorvastatin (LIPITOR) 40 MG tablet Take 1 tablet (40 mg) by mouth every evening 90 tablet 3     calcitonin, salmon, (MIACALCIN) 200 UNIT/ACT nasal spray Spray 1 spray into one nostril alternating nostrils daily 1 Bottle 11     calcium carbonate-vitamin D (CALCIUM 500/D) 500-200 MG-UNIT tablet Take 2 tablets by mouth daily       citalopram (CELEXA) 40 MG tablet Take 1 tablet (40 mg) by mouth daily 90 tablet 3     clopidogrel (PLAVIX) 75 MG tablet Take 1 tablet (75 mg) by mouth daily 90 tablet 3     docusate sodium (COLACE) 100 MG capsule Take 200 mg by mouth daily as needed for constipation       furosemide (LASIX) 40 MG tablet Take 1 tablet (40 mg) by mouth daily 90 tablet 3     gabapentin (NEURONTIN) 400 MG capsule Take 1 capsule (400 mg) by mouth 2 times daily 180 capsule 3     nitroGLYcerin (NITROSTAT) 0.4 MG  sublingual tablet Place 1 tablet (0.4 mg) under the tongue every 5 minutes as needed 25 tablet 6     oxyCODONE (ROXICODONE) 5 MG tablet Take 1-2 tablets (5-10 mg) by mouth every 4 hours as needed Fill on 19. 90 tablet 0     ranitidine (ZANTAC) 150 MG tablet Take 150 mg by mouth daily       Acetaminophen (TYLENOL 8 HOUR ARTHRITIS PAIN PO) Take 650 mg by mouth every 8 hours as needed         ALLERGIES     Allergies   Allergen Reactions     Hydroxychloroquine Rash     Omeprazole Rash     Pt had plain aspirin and reacted by developing many ulcers down esophagus. By Upper GI test they found the ulcers. Pt is ok to take low grade ecotrin without reaction.       PAST MEDICAL HISTORY:  No past medical history on file.    PAST SURGICAL HISTORY:  No past surgical history on file.    FAMILY HISTORY:  No family history on file.    SOCIAL HISTORY:  Social History     Socioeconomic History     Marital status:      Spouse name: None     Number of children: None     Years of education: None     Highest education level: None   Occupational History     None   Social Needs     Financial resource strain: None     Food insecurity:     Worry: None     Inability: None     Transportation needs:     Medical: None     Non-medical: None   Tobacco Use     Smoking status: Former Smoker     Last attempt to quit: 1952     Years since quittin.2     Smokeless tobacco: Former User   Substance and Sexual Activity     Alcohol use: No     Frequency: Never     Drug use: No     Sexual activity: None   Lifestyle     Physical activity:     Days per week: None     Minutes per session: None     Stress: None   Relationships     Social connections:     Talks on phone: None     Gets together: None     Attends Denominational service: None     Active member of club or organization: None     Attends meetings of clubs or organizations: None     Relationship status: None     Intimate partner violence:     Fear of current or ex partner: None      Emotionally abused: None     Physically abused: None     Forced sexual activity: None   Other Topics Concern     None   Social History Narrative     None       Review of Systems:  Skin:  Negative     Eyes:  Negative    ENT:  Negative    Respiratory:  Positive for shortness of breath  Cardiovascular:   negative for chest pain;Positive for;edema;fatigue;lightheadedness;dizziness  Gastroenterology: Negative    Genitourinary:  Negative    Musculoskeletal:  Negative    Neurologic:  Positive for numbness or tingling of hands;numbness or tingling of feet;headaches;memory problems;stroke  Psychiatric:  Positive for anxiety;depression  Heme/Lymph/Imm:  Negative    Endocrine:  Negative      Physical Exam:  Vitals: /73   Pulse 66   Wt 95.3 kg (210 lb)   SpO2 94%   BMI 28.48 kg/m       Recent Lab Results:  LIPID RESULTS:  Lab Results   Component Value Date    CHOL 132 03/04/2019    HDL 43 03/04/2019    LDL 64 03/04/2019    TRIG 126 03/04/2019       LIVER ENZYME RESULTS:  Lab Results   Component Value Date    AST 17 03/04/2019    ALT 20 03/04/2019       CBC RESULTS:  Lab Results   Component Value Date    WBC 4.1 01/26/2019    RBC 3.55 (L) 01/26/2019    HGB 11.8 (L) 01/26/2019    HCT 35.6 (L) 01/26/2019     01/26/2019    MCH 33.2 (H) 01/26/2019    MCHC 33.1 01/26/2019    RDW 13.5 01/26/2019     01/26/2019       BMP RESULTS:  Lab Results   Component Value Date     01/26/2019    POTASSIUM 4.5 01/26/2019    CHLORIDE 111 (H) 01/26/2019    CO2 25 01/26/2019    ANIONGAP 7 01/26/2019    GLC 84 01/26/2019    BUN 17 01/26/2019    CR 1.43 (H) 01/26/2019    GFRESTIMATED 45 (L) 01/26/2019    GFRESTBLACK 53 (L) 01/26/2019    SAI 9.1 01/26/2019        A1C RESULTS:  No results found for: A1C    INR RESULTS:  Lab Results   Component Value Date    INR 1.04 07/24/2006           CC  Marky Cox MD  [address] [place] [address]                    Thank you for allowing me to participate in the care of  your patient.      Sincerely,     Mariah Franco MD     [facility]    cc:   Marky Cox MD  [address] [place] [address]

## 2019-03-04 NOTE — PATIENT INSTRUCTIONS
"[facility]  [facility]~[address]~[place], [place]~[address]  Thank you for your [facility] visit today. If you have questions regarding your visit, please contact your cardiology RN's, Deysi Santiago or Abby Braun, at [phone]. Your provider has recommended the following:  Medication Changes:  None today. Continue taking your medications as you have been.    Recommendations:  PFT's next available  Echocardiogram next available.  See Dr. Palacios or Dr. Jurado to discuss Watchman/PVCs  Non fasting labs ALT, BMP, BNP in 3 months  Follow-up:  See Dr. Franco for cardiology follow up in 3 months    To schedule a future appointment, we kindly ask that you call cardiology scheduling at [phone] three months prior to requested revisit date.  [facility] cardiology clinic is staffed with \"Advance Practice Providers\". These are our cardiology Physician Assistants and Nurse Practitioners. Please call cardiology scheduling if you feel you need clinical evaluation with them at any time for any cardiac reason.                 Reminder:    For your safety, we ask that you bring in your current medication(s) or an updated list of your medications with you to EACH office visit. Include the medication name, dose of pill on bottle and how you are taking it. Include over-the-counter medications or supplements. Your provider will review this at each visit and plan your care based on your current information.       "

## 2019-03-04 NOTE — LETTER
3/4/2019      Marky Cox MD  [address] [facility] [address]      RE: Harlan Allen       Dear Colleague,    I had the pleasure of seeing Harlan Allen in the [facility].    Service Date: 03/04/2019      PRIMARY CARE PHYSICIAN:  Dr. Marky Cox.      REASON FOR VISIT:  Coronary artery disease, PVCs, heart failure with preserved EF.      HISTORY OF PRESENT ILLNESS:  This is a very pleasant 83-year-old male who was here with his significant other, his wife, Yaquelin.  The patient is transferring care from [facility] to [facility].  The patient used to live in [place] and has now moved to [place] a few years ago.  He has a history of multiple stents back in the early 2000s.  In 2004 the patient underwent 3-vessel bypass surgery, I believe at [facility].  This was a LIMA to LAD, vein graft to the ramus, and vein graft to diagonal and the right PDA.  In regards to his CAD, he is stable and his last coronary angiography was in 2013 which showed patent grafts and stable CAD.      The patient also has a history of symptomatic high burden of PVCs back in 2013 for which he was seen at [facility] by Dr. Driss Mendoza.  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  His post-ablation burden went down to 5%, and since 2013 he has been maintained on a low dose of amiodarone and symptomatically doing very well in that regard.      He also has a history of heart failure with preserved EF and mild aortic stenosis, at least back in 2016, for which he takes 40 mg of Lasix a day and has New York Heart Association functional class II symptoms.      He has a history of pulmonary embolism in the past and history of esophageal strictures needing repeated dilations.  He has had 3 strokes, the last one being in 2018, following which he was started on Plavix in addition to his chronic aspirin  therapy.      The patient is here to transfer care.  He says symptomatically he is doing well.  He walks with a cane and sometimes walks with a walker due to balance issues.  The primary issue is gait instability, and he has fallen close to over 10 times in the last 1 year.  He says he has injured his ribs and bruised them and has also had a hip fracture that was conservatively managed as a result of his falls.  Otherwise, the patient denies any classic symptoms of angina.  He does complain of chronic 1+ lower extremity edema which is slightly worse today because he has not taken his diuretics this morning.  Otherwise, no orthopnea, PND, syncope, presyncope.      Recently he went to the Emergency Department in 01/2019 and was noted to have atrial fibrillation on 1 of his ECGs.  Prior monitorings have not shown atrial fibrillation, which were done for stroke workup.  He has not been started on anticoagulation as yet.      CURRENT MEDICATIONS:   1.  Amiodarone 100 mg daily.   2.  Amlodipine 2.5 mg daily.   3.  Aspirin 81 mg daily.   4.  Atorvastatin 40 mg daily.   5.  Citalopram 40 mg daily.   6.  Plavix 75 mg daily.   7.  Lasix 40 mg daily.   8.  Gabapentin 400 b.i.d.   9.  Ranitidine 150 daily.      PHYSICAL EXAMINATION:   VITAL SIGNS:  Blood pressure is 143/73, pulse is 66, weight is 210 pounds.   GENERAL:  Alert, oriented x3, in no acute distress.   NECK:  Supple.  JVP is about 10-12 cm.   LUNGS:  Clear to auscultation bilaterally.   CARDIAC:  Cardiac sounds are irregular, and there is a harsh 3/6 ejection systolic murmur with a soft S2 best heard in the aortic area radiating to the carotids.  There is no S3.     EXTREMITIES:  Warm with 1+ bilateral pitting edema.   ABDOMEN:  Soft, nontender, nondistended.   PSYCH:  Appropriate affect but does have reported depression.   HEENT:  No icterus or pallor.   VASCULAR:  Pulses are equal bilaterally in upper extremities.     NEUROLOGIC:  Exam was deferred.      DATA:  LDL  is 64, creatinine is 1.43, hemoglobin is 12 and TSH is 2.11.  Normal liver function, ALT of 20.  ECG from 01/26/2019 shows atrial fibrillation with a controlled ventricular response.  Last echocardiogram was from 2016.      ASSESSMENT:   1.  Stable coronary artery disease with bypass surgery in 2004 with LIMA to LAD and vein grafts to diagonal, ramus and RPDA.  Last angiogram in 2013 showing patent grafts.   2.  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 Mendoza.   3.  Mild aortic stenosis, at least as of 2016.   4.  History of pulmonary embolism.   5.  History of esophageal strictures.   6.  History of recurrent strokes.   7.  History of new onset atrial fibrillation Jan 2019 diagnosis in the ED with Flu like symptoms.   8.  History of recurrent falls.   9.  Heart failure with preserved ejection fraction, New York Heart Association functional class II symptomatology.      PLAN:   1.  In regards to coronary artery disease, the patient seems to be stable.  No escalating anginal symptoms.  Continue statin therapy.  LDL is at 64, which is at goal.  He is not on beta blockers at this time because of his lowish or controlled heart rate.   2.  In regards to his PVCs, continue amiodarone 100 mg daily at this time.  He feels symptomatically better and his burden had significantly come down in 2013 after ablation.  I will get PFTs for amiodarone screening.  Otherwise, ALT and TSH look okay at this time.   3.  New onset atrial fibrillation.  This was rate controlled.  We went over risks and benefits of stroke prevention versus bleeding risk in the setting of anticoagulation for atrial fibrillation.  He has had recurrent strokes and is high risk of further strokes at this time.  I recommend he be considered for a Watchman device and will refer him to Dr. Palacios for potential LAAO consideration.  We went over his candidacy for Watchman in general and also issues regarding the need for  preprocedural and intraprocedural transesophageal echo, for which he will need GI clearance and dilation prior to us intervening.  He will contact his GI team he says.   4.  In regards to the aortic stenosis, murmur sounds quite harsh and was at least mild back in 2016.  This may have certainly progressed.  We will get another echocardiogram, and if it is severe aortic stenosis, I recommend he be referred to the T[facility]R Clinic.  I will see him back in 3 months.  He will continue his 40 mg of Lasix for his volume status, which otherwise currently seems mildly up but is not bothersome to him.  If he needs further increases in diuretics, then he will follow up sooner with us.        cc:   Marky Cox MD    [facility]    [address] [place]  [address]         VIDYA AREVALO MD             D: 2019   T: 2019   MT: LUIS FERNANDO      Name:     ESEQUIEL MARIEE   MRN:      [id number]        Account:      [id number]   :      1935           Service Date: 2019      Document: [id number]           Outpatient Encounter Medications as of 3/4/2019   Medication Sig Dispense Refill     amiodarone (PACERONE/CODARONE) 200 MG tablet Take 0.5 tablets (100 mg) by mouth daily 45 tablet 3     amLODIPine (NORVASC) 2.5 MG tablet Take 1 tablet (2.5 mg) by mouth daily 90 tablet 3     aspirin (ASA) 81 MG EC tablet Take 81 mg by mouth daily       atorvastatin (LIPITOR) 40 MG tablet Take 1 tablet (40 mg) by mouth every evening 90 tablet 3     calcitonin, salmon, (MIACALCIN) 200 UNIT/ACT nasal spray Spray 1 spray into one nostril alternating nostrils daily 1 Bottle 11     calcium carbonate-vitamin D (CALCIUM 500/D) 500-200 MG-UNIT tablet Take 2 tablets by mouth daily       citalopram (CELEXA) 40 MG tablet Take 1 tablet (40 mg) by mouth daily 90 tablet 3     clopidogrel (PLAVIX) 75 MG tablet Take 1 tablet (75 mg) by mouth daily 90 tablet 3     docusate sodium (COLACE) 100 MG capsule Take 200 mg by  mouth daily as needed for constipation       furosemide (LASIX) 40 MG tablet Take 1 tablet (40 mg) by mouth daily 90 tablet 3     gabapentin (NEURONTIN) 400 MG capsule Take 1 capsule (400 mg) by mouth 2 times daily 180 capsule 3     nitroGLYcerin (NITROSTAT) 0.4 MG sublingual tablet Place 1 tablet (0.4 mg) under the tongue every 5 minutes as needed 25 tablet 6     oxyCODONE (ROXICODONE) 5 MG tablet Take 1-2 tablets (5-10 mg) by mouth every 4 hours as needed Fill on 4-2-19. 90 tablet 0     ranitidine (ZANTAC) 150 MG tablet Take 150 mg by mouth daily       Acetaminophen (TYLENOL 8 HOUR ARTHRITIS PAIN PO) Take 650 mg by mouth every 8 hours as needed       No facility-administered encounter medications on file as of 3/4/2019.        Again, thank you for allowing me to participate in the care of your patient.      Sincerely,    Mariah Franco MD     [facility]

## 2019-03-04 NOTE — PROGRESS NOTES
Service Date: 03/04/2019      PRIMARY CARE PHYSICIAN:  Dr. Marky Cox.      REASON FOR VISIT:  Coronary artery disease, PVCs, heart failure with preserved EF.      HISTORY OF PRESENT ILLNESS:  This is a very pleasant 83-year-old male who was here with his significant other, his wife, Yaquelin.  The patient is transferring care from [facility] to [facility].  The patient used to live in [place] and has now moved to [place] a few years ago.  He has a history of multiple stents back in the early 2000s.  In 2004 the patient underwent 3-vessel bypass surgery, I believe at [facility].  This was a LIMA to LAD, vein graft to the ramus, and vein graft to diagonal and the right PDA.  In regards to his CAD, he is stable and his last coronary angiography was in 2013 which showed patent grafts and stable CAD.      The patient also has a history of symptomatic high burden of PVCs back in 2013 for which he was seen at [facility] by Dr. Driss Mendoza.  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  His post-ablation burden went down to 5%, and since 2013 he has been maintained on a low dose of amiodarone and symptomatically doing very well in that regard.      He also has a history of heart failure with preserved EF and mild aortic stenosis, at least back in 2016, for which he takes 40 mg of Lasix a day and has New York Heart Association functional class II symptoms.      He has a history of pulmonary embolism in the past and history of esophageal strictures needing repeated dilations.  He has had 3 strokes, the last one being in 2018, following which he was started on Plavix in addition to his chronic aspirin therapy.      The patient is here to transfer care.  He says symptomatically he is doing well.  He walks with a cane and sometimes walks with a walker due to balance issues.  The primary issue is gait instability, and he has  fallen close to over 10 times in the last 1 year.  He says he has injured his ribs and bruised them and has also had a hip fracture that was conservatively managed as a result of his falls.  Otherwise, the patient denies any classic symptoms of angina.  He does complain of chronic 1+ lower extremity edema which is slightly worse today because he has not taken his diuretics this morning.  Otherwise, no orthopnea, PND, syncope, presyncope.      Recently he went to the Emergency Department in 01/2019 and was noted to have atrial fibrillation on 1 of his ECGs.  Prior monitorings have not shown atrial fibrillation, which were done for stroke workup.  He has not been started on anticoagulation as yet.      CURRENT MEDICATIONS:   1.  Amiodarone 100 mg daily.   2.  Amlodipine 2.5 mg daily.   3.  Aspirin 81 mg daily.   4.  Atorvastatin 40 mg daily.   5.  Citalopram 40 mg daily.   6.  Plavix 75 mg daily.   7.  Lasix 40 mg daily.   8.  Gabapentin 400 b.i.d.   9.  Ranitidine 150 daily.      PHYSICAL EXAMINATION:   VITAL SIGNS:  Blood pressure is 143/73, pulse is 66, weight is 210 pounds.   GENERAL:  Alert, oriented x3, in no acute distress.   NECK:  Supple.  JVP is about 10-12 cm.   LUNGS:  Clear to auscultation bilaterally.   CARDIAC:  Cardiac sounds are irregular, and there is a harsh 3/6 ejection systolic murmur with a soft S2 best heard in the aortic area radiating to the carotids.  There is no S3.     EXTREMITIES:  Warm with 1+ bilateral pitting edema.   ABDOMEN:  Soft, nontender, nondistended.   PSYCH:  Appropriate affect but does have reported depression.   HEENT:  No icterus or pallor.   VASCULAR:  Pulses are equal bilaterally in upper extremities.     NEUROLOGIC:  Exam was deferred.      DATA:  LDL is 64, creatinine is 1.43, hemoglobin is 12 and TSH is 2.11.  Normal liver function, ALT of 20.  ECG from 01/26/2019 shows atrial fibrillation with a controlled ventricular response.  Last echocardiogram was from 2016.       ASSESSMENT:   1.  Stable coronary artery disease with bypass surgery in 2004 with LIMA to LAD and vein grafts to diagonal, ramus and RPDA.  Last angiogram in 2013 showing patent grafts.   2.  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 Mendoza.   3.  Mild aortic stenosis, at least as of 2016.   4.  History of pulmonary embolism.   5.  History of esophageal strictures.   6.  History of recurrent strokes.   7.  History of new onset atrial fibrillation Jan 2019 diagnosis in the ED with Flu like symptoms.   8.  History of recurrent falls.   9.  Heart failure with preserved ejection fraction, New York Heart Association functional class II symptomatology.      PLAN:   1.  In regards to coronary artery disease, the patient seems to be stable.  No escalating anginal symptoms.  Continue statin therapy.  LDL is at 64, which is at goal.  He is not on beta blockers at this time because of his lowish or controlled heart rate.   2.  In regards to his PVCs, continue amiodarone 100 mg daily at this time.  He feels symptomatically better and his burden had significantly come down in 2013 after ablation.  I will get PFTs for amiodarone screening.  Otherwise, ALT and TSH look okay at this time.   3.  New onset atrial fibrillation.  This was rate controlled.  We went over risks and benefits of stroke prevention versus bleeding risk in the setting of anticoagulation for atrial fibrillation.  He has had recurrent strokes and is high risk of further strokes at this time.  I recommend he be considered for a Watchman device and will refer him to Dr. Palacios for potential LAAO consideration.  We went over his candidacy for Watchman in general and also issues regarding the need for preprocedural and intraprocedural transesophageal echo, for which he will need GI clearance and dilation prior to us intervening.  He will contact his GI team he says.   4.  In regards to the aortic stenosis, murmur sounds  quite harsh and was at least mild back in 2016.  This may have certainly progressed.  We will get another echocardiogram, and if it is severe aortic stenosis, I recommend he be referred to the [facility] Clinic.  I will see him back in 3 months.  He will continue his 40 mg of Lasix for his volume status, which otherwise currently seems mildly up but is not bothersome to him.  If he needs further increases in diuretics, then he will follow up sooner with us.        cc:   Marky Cox MD    [facility]    [address] [place]  [address]         VIDYA AREVALO MD             D: 2019   T: 2019   MT: LUIS FERNANDO      Name:     ESEQUIEL MARIEE   MRN:      [id number]        Account:      [id number]   :      1935           Service Date: 2019      Document: [id number]

## 2019-03-05 NOTE — TELEPHONE ENCOUNTER
Reason for Call:  Other referral    Detailed comments: Patient needs a referral to Dr. Roge Ballard at [place], [place] to do a procedure to stretch his esophagus.  He needs this done prior to a cardiology appointment on 3/13/19.  Phone number for Dr. Ballard is [phone].    Phone Number Patient can be reached at: Home number on file [phone] (home)    Best Time: any    Can we leave a detailed message on this number? YES    Call taken on 3/5/2019 at 9:07 AM by Adriane Worthy

## 2019-03-06 NOTE — TELEPHONE ENCOUNTER
Need to know where to fax. Attempted patient-no answer/voicemail. Do no have consent on file to discuss PHI with Yaquelin.     Attempted Dr. Ballard's office-no answer/no voicemail.       BEVERLY JacoboN, RN

## 2019-03-07 NOTE — TELEPHONE ENCOUNTER
Called [facility] and verfied fax: [phone]. Order was faxed. Patient was notified by phone.  BEVERLY JacoboN, RN

## 2019-03-10 NOTE — TELEPHONE ENCOUNTER
Prior Authorization Retail Medication Request    Medication/Dose: oxycodone  ICD code (if different than what is on RX):    Previously Tried and Failed:  Fentanyl; norco  Rationale:  Patient has used since 1/2018 with success    Insurance Name:  [facility]  Insurance ID:  [id number]  Cover My Med Key: [id number]    Pharmacy Information (if different than what is on RX)  Name:    Phone:

## 2019-03-11 PROBLEM — I48.91 ATRIAL FIBRILLATION (H): Status: ACTIVE | Noted: 2019-01-01

## 2019-03-11 NOTE — PROGRESS NOTES
[facility] [place]  [address] [facility] [address]  [phone]  Dept: [phone]    PRE-OP EVALUATION:  Today's date: 3/11/2019    Harlanart Allen (: 1935) presents for pre-operative evaluation assessment as requested by Dr. Ballard He requires evaluation and anesthesia risk assessment prior to undergoing surgery/procedure for treatment of Upper endoscopy w/ anesthesia w/ biopsies, dilation and botox     Proposed Surgery/ Procedure: Upper endoscopy w/ anesthesia w/ biopsies, dilation and botox     Date of Surgery/ Procedure: 3/12/19   Time of Surgery/ Procedure: 10:00 A.M.   Hospital/Surgical Facility: [facility] [place]   [phone]  Primary Physician: Marky Cox  Type of Anesthesia Anticipated: to be determined    Patient has a Health Care Directive or Living Will:  NO    1. YES - Do you have a history of heart attack, stroke, stent, bypass or surgery on an artery in the head, neck, heart or legs? Has 4 stents, 3 strokes and quadruple bipass   2. NO - Do you ever have any pain or discomfort in your chest?  3. NO - Do you have a history of  Heart Failure?  4. NO - Are you troubled by shortness of breath when: walking on the level, up a slight hill or at night?  5. NO - Do you currently have a cold, bronchitis or other respiratory infection?  6. NO - Do you have a cough, shortness of breath or wheezing?  7. YES - Do you sometimes get pains in the calves of your legs when you walk?  8. NO - Do you or anyone in your family have previous history of blood clots?  9. NO - Do you or does anyone in your family have a serious bleeding problem such as prolonged bleeding following surgeries or cuts?  10. NO - Have you ever had problems with anemia or been told to take iron pills?  11. NO - Have you had any abnormal blood loss such as black, tarry or bloody stools, or abnormal vaginal bleeding?  12. YES - Have you ever had a blood transfusion?  13.  NO - Have you or any of your relatives ever had problems with anesthesia?  14. NO - Do you have sleep apnea, excessive snoring or daytime drowsiness?  15. NO- Do you have any prosthetic heart valves?   16. YES - Do you have prosthetic joints? Both knees   17. NO - Is there any chance that you may be pregnant?      HPI:     HPI related to upcoming procedure: the patient needs esophageal dilation with Botox injection for treatment of dysphagia and esophageal strictures, he states he get this done usually once or twice per year. He has extensive heart history: multiple stents back in the early 2000s.  In 2004 the patient underwent 3-vessel bypass surgery, CAD, he is stable and his last coronary angiography was in 2013 which showed patent grafts and stable CAD.      He also has a history of heart failure with preserved EF and mild aortic stenosis, at least back in 2016, for which he takes 40 mg of Lasix a day. He has LE edema, which is slightly worse today because he did not take his Lasix.    He has had 3 strokes, the last one being in 2018, following which he was started on Plavix in addition to his chronic aspirin therapy.       The patient states he is feeling well today, he denies any classic symptoms of angina, no orthopnea, PND, syncope, presyncope.      He saw cardiologist recently who recommending EDNA Echo, but the patient needs to get esophageal dilation done first before ECHO.    A-FIB - Patient has a longstanding history of chronic A-fib currently on rate control. Current treatment regimen includes Aspirin and Plavix for stroke prevention and denies significant symptoms of lightheadedness, palpitations or dyspnea.                                                                                                                                                                             .  CAD - Patient has a longstanding history of moderate-severe CAD. Patient denies recent chest pain or NTG use, denies  exercise induced dyspnea or PND. Last EKG 1/26/2019, controlled afib.                                                                                                                                                .  DEPRESSION - Patient has a long history of Depression of moderate severity requiring medication for control with recent symptoms being stable..Current symptoms of depression include none.                                                                                                                                                                                    .  HYPERTENSION - Patient has longstanding history of HTN , currently denies any symptoms referable to elevated blood pressure. Specifically denies chest pain, palpitations, dyspnea, orthopnea, PND or peripheral edema. Blood pressure readings have been in normal range. Current medication regimen is as listed below. Patient denies any side effects of medication.                                                                                                                                                                                          .  RENAL INSUFFICIENCY - Patient has a longstanding history of moderate-severe chronic renal insufficiency. Last Cr 1.43                                                                                                                                                                             .  MEDICAL HISTORY:     Patient Active Problem List    Diagnosis Date Noted     Atrial fibrillation (H) 03/11/2019     Priority: Medium     Essential hypertension 02/07/2019     Priority: Medium     Hyperlipidemia LDL goal <100 02/07/2019     Priority: Medium     Moderate major depression (H) 02/07/2019     Priority: Medium     ASHD (arteriosclerotic heart disease) 02/07/2019     Priority: Medium      History reviewed. No pertinent past medical history.  History reviewed. No pertinent surgical history.  Current  Outpatient Medications   Medication Sig Dispense Refill     amiodarone (PACERONE/CODARONE) 200 MG tablet Take 0.5 tablets (100 mg) by mouth daily 45 tablet 3     amLODIPine (NORVASC) 2.5 MG tablet Take 1 tablet (2.5 mg) by mouth daily 90 tablet 3     atorvastatin (LIPITOR) 40 MG tablet Take 1 tablet (40 mg) by mouth every evening 90 tablet 3     calcitonin, salmon, (MIACALCIN) 200 UNIT/ACT nasal spray Spray 1 spray into one nostril alternating nostrils daily 1 Bottle 11     calcium carbonate-vitamin D (CALCIUM 500/D) 500-200 MG-UNIT tablet Take 2 tablets by mouth daily       citalopram (CELEXA) 40 MG tablet Take 1 tablet (40 mg) by mouth daily 90 tablet 3     furosemide (LASIX) 40 MG tablet Take 1 tablet (40 mg) by mouth daily 90 tablet 3     gabapentin (NEURONTIN) 400 MG capsule Take 1 capsule (400 mg) by mouth 2 times daily 180 capsule 3     oxyCODONE (ROXICODONE) 5 MG tablet Take 1-2 tablets (5-10 mg) by mouth every 4 hours as needed Fill on 4-2-19. 90 tablet 0     ranitidine (ZANTAC) 150 MG tablet Take 150 mg by mouth daily       Acetaminophen (TYLENOL 8 HOUR ARTHRITIS PAIN PO) Take 650 mg by mouth every 8 hours as needed       aspirin (ASA) 81 MG EC tablet Take 81 mg by mouth daily       clopidogrel (PLAVIX) 75 MG tablet Take 1 tablet (75 mg) by mouth daily (Patient not taking: Reported on 3/11/2019) 90 tablet 3     docusate sodium (COLACE) 100 MG capsule Take 200 mg by mouth daily as needed for constipation       nitroGLYcerin (NITROSTAT) 0.4 MG sublingual tablet Place 1 tablet (0.4 mg) under the tongue every 5 minutes as needed (Patient not taking: Reported on 3/11/2019) 25 tablet 6     OTC products: none    Allergies   Allergen Reactions     Hydroxychloroquine Rash     Omeprazole Rash     Pt had plain aspirin and reacted by developing many ulcers down esophagus. By Upper GI test they found the ulcers. Pt is ok to take low grade ecotrin without reaction.      Latex Allergy: NO    Social History     Tobacco  Use     Smoking status: Former Smoker     Last attempt to quit: 1952     Years since quittin.2     Smokeless tobacco: Former User   Substance Use Topics     Alcohol use: No     Frequency: Never     History   Drug Use No       REVIEW OF SYSTEMS:   Constitutional, HEENT, cardiovascular, pulmonary, gi and gu systems are negative, except as otherwise noted.    EXAM:   /72 (BP Location: Left arm, Patient Position: Sitting, Cuff Size: Adult Large)   Pulse 78   Temp 98.1  F (36.7  C) (Tympanic)   Resp 16   Wt 94.9 kg (209 lb 4.8 oz)   SpO2 97%   BMI 28.39 kg/m      GENERAL APPEARANCE: healthy, alert and no distress     EYES: EOMI,  PERRL     HENT: ear canals and TM's normal and nose and mouth without ulcers or lesions     NECK: no adenopathy, no asymmetry, masses, or scars and thyroid normal to palpation     RESP: lungs clear to auscultation - no rales, rhonchi or wheezes     CV: irregularly irregular rhythm, murmur 3/6 early systolic murmur and +2 pitting B/L LE edema ankles and feet     MS: extremities normal- no gross deformities noted, no evidence of inflammation in joints, FROM in all extremities.     SKIN: no suspicious lesions or rashes     NEURO: Normal strength and tone, sensory exam grossly normal, mentation intact and speech normal     PSYCH: mentation appears normal. and affect normal/bright     LYMPHATICS: No cervical adenopathy    DIAGNOSTICS:   Was cleared by cardiologist  Lab Results   Component Value Date    WBC 5.2 2019     Lab Results   Component Value Date    RBC 3.30 2019     Lab Results   Component Value Date    HGB 10.6 2019     Lab Results   Component Value Date    HCT 33.0 2019     No components found for: MCT  Lab Results   Component Value Date     2019     Lab Results   Component Value Date    MCH 32.1 2019     Lab Results   Component Value Date    MCHC 32.1 2019     Lab Results   Component Value Date    RDW 13.5 2019      Lab Results   Component Value Date     03/11/2019     Last Comprehensive Metabolic Panel:  Sodium   Date Value Ref Range Status   03/11/2019 143 133 - 144 mmol/L Final     Potassium   Date Value Ref Range Status   03/11/2019 4.6 3.4 - 5.3 mmol/L Final     Chloride   Date Value Ref Range Status   03/11/2019 109 94 - 109 mmol/L Final     Carbon Dioxide   Date Value Ref Range Status   03/11/2019 30 20 - 32 mmol/L Final     Anion Gap   Date Value Ref Range Status   03/11/2019 4 3 - 14 mmol/L Final     Glucose   Date Value Ref Range Status   03/11/2019 86 70 - 99 mg/dL Final     Urea Nitrogen   Date Value Ref Range Status   03/11/2019 28 7 - 30 mg/dL Final     Creatinine   Date Value Ref Range Status   03/11/2019 1.62 (H) 0.66 - 1.25 mg/dL Final     GFR Estimate   Date Value Ref Range Status   03/11/2019 39 (L) >60 mL/min/[1.73_m2] Final     Comment:     Non  GFR Calc  Starting 12/18/2018, serum creatinine based estimated GFR (eGFR) will be   calculated using the Chronic Kidney Disease Epidemiology Collaboration   (CKD-EPI) equation.       Calcium   Date Value Ref Range Status   03/11/2019 9.4 8.5 - 10.1 mg/dL Final       Recent Labs   Lab Test 01/26/19  0901   HGB 11.8*         POTASSIUM 4.5   CR 1.43*     IMPRESSION:   Reason for surgery/procedure: esophageal stricture   Diagnosis/reason for consult: pre-op evaluation     The proposed surgical procedure is considered INTERMEDIATE risk.    REVISED CARDIAC RISK INDEX  The patient has the following serious cardiovascular risks for perioperative complications such as (MI, PE, VFib and 3  AV Block):  No serious cardiac risks  INTERPRETATION: 1 risks: Class II (low risk - 0.9% complication rate)    The patient has the following additional risks for perioperative complications:  No identified additional risks      ICD-10-CM    1. Preop general physical exam Z01.818 Basic metabolic panel     CBC with platelets   2. Esophageal stricture  K22.2    3. Chronic atrial fibrillation (H) I48.2    4. CKD (chronic kidney disease) stage 3, GFR 30-59 ml/min (H) N18.3 Creatinine       RECOMMENDATIONS:     --Patient is to take all scheduled medications on the day of surgery EXCEPT for modifications listed below.  --hold Lasix 24 hrs prior surgery   --recheck Creatinine in 1-2 weeks   --follow up with cardiologist after ECHO results     Anticoagulant or Antiplatelet Medication Use  PLAVIX: No contraindication to stopping plavix.  Stop 7-10 days prior to surgery        APPROVAL GIVEN to proceed with proposed procedure, without further diagnostic evaluation       Signed Electronically by: CHAU Arevalo CNP    Copy of this evaluation report is provided to requesting physician.    [facility] Preop Guidelines    Revised Cardiac Risk Index

## 2019-03-11 NOTE — PATIENT INSTRUCTIONS
Before Your Surgery      Call your surgeon if there is any change in your health. This includes signs of a cold or flu (such as a sore throat, runny nose, cough, rash or fever).    Do not smoke, drink alcohol or take over the counter medicine (unless your surgeon or primary care doctor tells you to) for the 24 hours before and after surgery.    If you take prescribed drugs: Follow your doctor s orders about which medicines to take and which to stop until after surgery.    Eating and drinking prior to surgery: follow the instructions from your surgeon    Take a shower or bath the night before surgery. Use the soap your surgeon gave you to gently clean your skin. If you do not have soap from your surgeon, use your regular soap. Do not shave or scrub the surgery site.  Wear clean pajamas and have clean sheets on your bed.     Labs: CBC, BMP    Hold Plavix    Hold Lasix 24 hrs prior procedure

## 2019-03-13 NOTE — TELEPHONE ENCOUNTER
Prior Authorization Approval    Authorization Effective Date: 12/13/2018  Authorization Expiration Date: 3/12/2020  Medication: oxycodone- APPROVED  Approved Dose/Quantity: 93 PER 8 DAYS  Reference #:     Insurance Company: [employer] - Phone [phone] Fax [phone]  Expected CoPay:       CoPay Card Available:      Foundation Assistance Needed:    Which Pharmacy is filling the prescription (Not needed for infusion/clinic administered): [facility] [address] [place] [address]  Pharmacy Notified: Yes  Patient Notified: Yes    Called [employer] and received PA over the phone. PA approved 12/13/18-03/12/20. Case #[id number].

## 2019-03-25 NOTE — RESULT ENCOUNTER NOTE
Results noted: normal pulmonary function but unable to complete lung volumes. To be discussed at OV with Dr Palacios on 4/11/19
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
## 2019-04-15 NOTE — TELEPHONE ENCOUNTER
Reason for Call:  Medication or medication refill:    Do you use a [facility]?  Name of the pharmacy and phone number for the current request:  [facility] [place], [phone]    Name of the medication requested: Ranitidine - Pt's spouse called for refill.  No need to call patient back, unless there are questions or problems.      Ranitidine  Last Written Prescription Date:  Reported  Last Fill Quantity: ?,  # refills: ?   Last office visit: 3/11/2019 with prescribing provider:     Future Office Visit:      Other request:     Can we leave a detailed message on this number? YES    Phone number patient can be reached at: Home number on file [phone] (home)    Best Time: any    Call taken on 4/15/2019 at 2:38 PM by Stephanie Levine

## 2019-04-15 NOTE — TELEPHONE ENCOUNTER
Dr. Cox,    Routing refill request to provider for review/approval because:  Medication is reported/historical Marcientgildardo MORALES RN

## 2019-04-16 NOTE — TELEPHONE ENCOUNTER
Writer called and informed patient that the prescription was sent to the pharmacy.    Patient requested to be scheduled for a visit with Dr Cox to discuss his pain medications.      Patient was scheduled for Wed 4/24.    No further action necessary.'  Encounter closed.    Michele Saenz RN

## 2019-04-16 NOTE — LETTER
4/16/2019    Marky Cox MD  [address] [facility] [address]    RE: Harlan Allen       Dear Colleague,    I had the pleasure of seeing Harlan Allen in the [facility].    Electrophysiology/ Clinic Note         H&P and Plan:     REASON FOR VISIT: Electrophysiology evaluation.      HISTORY OF PRESENT ILLNESS: Mr. Allen is a pleasant 83-year-old gentleman with a history of hypertension, hyperlipidemia, symptomatic PVCs on (PVC ablation 2013, partially successful, on chronic amiodarone therapy), esophageal stricture, previous PE and CVAs (x3) and coronary artery disease (multiple PCI's in 2000 and 2018, CABG in 2004; LIMA to LAD, SVG to ramus and SVG to diagonal and right PDA), who was recently found to have atrial fibrillation and was referred here for evaluation and consideration for watchman procedure.    Patient was seen in the ED in January of this year with flulike symptoms.  At that time he was found to have A. fib.  Later, he follow-up with Dr. Franco in clinic and due to the history of frequent falls and high risk for bleeding, a decision was made not to start oral anticoagulation.  He was referred here for consideration for watchman procedure.      At the moment, he is doing well. He seems to be very limited due to previous strokes and has significant balance issues.  According to his wife, he falls very frequently.  He had at least 10 major falls in the last year.  He was also seen in the ED in April due to another episode of fall.  He is currently taking aspirin and Plavix, and exhibits significant bruising throughout both arms. One of his arms had start bleeding during this visit.    Denies other symptoms such as chest pain, shortness of breath, near-syncope or syncope.      ASSESSMENT AND PLAN:   1.    Atrial fibrillation.  He already had 3 strokes in the past.  His chads vas score is 6.  He is also high risk for bleeding due to balance issues and  frequent falls.  I agree with Dr. Franco, he is high risk for complications related to long-term use of oral anticoagulation and may benefit from watchman procedure.     I explained the procedure in details.  They understand that there is 2-3% complication associated procedure and not everybody has fever anatomy.  Prior to make a decision about the procedure he will require a EDNA to evaluate left atrial appendage anatomy and for stability her for watchman.  I will contact Dr. Franco to discuss plan.  He will need EGD prior to EDNA.    In the meantime, will continue therapy with aspirin, Plavix  and amiodarone.      De Jurado MD    Physical Exam:  Vitals: /55 (BP Location: Right arm, Patient Position: Sitting, Cuff Size: Adult Regular)   Pulse 83   Wt 95.3 kg (210 lb 3.2 oz)   SpO2 98%   BMI 27.73 kg/m       Constitutional:  AAO x3.  Pt is in NAD.  HEAD: normocephalic.  SKIN: Skin normal color, texture and turgor with no lesions or eruptions.  Eyes: PERRL, EOMI.  ENT:  Supple, normal JVP. No lymphadenopathy or thyroid enlargement.  Chest:  CTAB  Cardiac:  RRR, normal  S1 and S2.  No murmurs rubs or gallop.  Systolic murmur in LLSB II/VI.  Abdomen:  Normal BS.  Soft, non-tender and non-distended.  No rebound or guarding.    Extremities:  Pedious pulses palpable B/L.  No LE edema noticed.   Neurological: Strength and sensation grossly symmetric and intact throughout.       CURRENT MEDICATIONS:  Current Outpatient Medications   Medication Sig Dispense Refill     Acetaminophen (TYLENOL 8 HOUR ARTHRITIS PAIN PO) Take 650 mg by mouth every 8 hours as needed       amiodarone (PACERONE/CODARONE) 200 MG tablet Take 0.5 tablets (100 mg) by mouth daily 45 tablet 3     amLODIPine (NORVASC) 2.5 MG tablet Take 1 tablet (2.5 mg) by mouth daily 90 tablet 3     aspirin (ASA) 81 MG EC tablet Take 81 mg by mouth daily       atorvastatin (LIPITOR) 40 MG tablet Take 1 tablet (40 mg) by mouth every evening 90 tablet 3      calcitonin, salmon, (MIACALCIN) 200 UNIT/ACT nasal spray Spray 1 spray into one nostril alternating nostrils daily 1 Bottle 11     calcium carbonate-vitamin D (CALCIUM 500/D) 500-200 MG-UNIT tablet Take 2 tablets by mouth daily       citalopram (CELEXA) 40 MG tablet Take 1 tablet (40 mg) by mouth daily 90 tablet 3     clopidogrel (PLAVIX) 75 MG tablet Take 1 tablet (75 mg) by mouth daily 90 tablet 3     docusate sodium (COLACE) 100 MG capsule Take 200 mg by mouth daily as needed for constipation       furosemide (LASIX) 40 MG tablet Take 1 tablet (40 mg) by mouth daily 90 tablet 3     gabapentin (NEURONTIN) 400 MG capsule Take 1 capsule (400 mg) by mouth 2 times daily 180 capsule 3     nitroGLYcerin (NITROSTAT) 0.4 MG sublingual tablet Place 1 tablet (0.4 mg) under the tongue every 5 minutes as needed 25 tablet 6     oxyCODONE (ROXICODONE) 5 MG tablet Take 1-2 tablets (5-10 mg) by mouth every 4 hours as needed Fill on 4-2-19. 90 tablet 0     ranitidine (ZANTAC) 150 MG tablet Take 1 tablet (150 mg) by mouth daily 30 tablet 11       ALLERGIES     Allergies   Allergen Reactions     Hydroxychloroquine Rash     Omeprazole Rash     Pt had plain aspirin and reacted by developing many ulcers down esophagus. By Upper GI test they found the ulcers. Pt is ok to take low grade ecotrin without reaction.       PAST MEDICAL HISTORY:  No past medical history on file.    PAST SURGICAL HISTORY:  No past surgical history on file.    FAMILY HISTORY:  No family history on file.    SOCIAL HISTORY:  Social History     Socioeconomic History     Marital status:      Spouse name: None     Number of children: None     Years of education: None     Highest education level: None   Occupational History     None   Social Needs     Financial resource strain: None     Food insecurity:     Worry: None     Inability: None     Transportation needs:     Medical: None     Non-medical: None   Tobacco Use     Smoking status: Former Smoker     Last  attempt to quit: 1952     Years since quittin.3     Smokeless tobacco: Former User   Substance and Sexual Activity     Alcohol use: No     Frequency: Never     Drug use: No     Sexual activity: None   Lifestyle     Physical activity:     Days per week: None     Minutes per session: None     Stress: None   Relationships     Social connections:     Talks on phone: None     Gets together: None     Attends [employer] service: None     Active member of club or organization: None     Attends meetings of clubs or organizations: None     Relationship status: None     Intimate partner violence:     Fear of current or ex partner: None     Emotionally abused: None     Physically abused: None     Forced sexual activity: None   Other Topics Concern     None   Social History Narrative     None       Review of Systems:  Skin:  Positive for bruising   Eyes:  Positive for glasses  ENT:  Negative    Respiratory:  Positive for dyspnea on exertion  Cardiovascular:    Positive for;chest pain;palpitations;lower extremity symptoms;edema;fatigue;lightheadedness;dizziness  Gastroenterology: Positive for excessive gas or bloating  Genitourinary:  Negative    Musculoskeletal:  Positive for arthritis;joint pain;joint swelling;joint stiffness  Neurologic:  Positive for numbness or tingling of hands;numbness or tingling of feet;memory problems;stroke  Psychiatric:  Positive for anxiety;depression  Heme/Lymph/Imm:  Negative    Endocrine:  Negative        Recent Lab Results:  LIPID RESULTS:  Lab Results   Component Value Date    CHOL 132 2019    HDL 43 2019    LDL 64 2019    TRIG 126 2019       LIVER ENZYME RESULTS:  Lab Results   Component Value Date    AST 17 2019    ALT 20 2019       CBC RESULTS:  Lab Results   Component Value Date    WBC 5.2 2019    RBC 3.30 (L) 2019    HGB 10.6 (L) 2019    HCT 33.0 (L) 2019     2019    MCH 32.1 2019    MCHC 32.1 2019     RDW 13.5 03/11/2019     03/11/2019       BMP RESULTS:  Lab Results   Component Value Date     03/11/2019    POTASSIUM 4.6 03/11/2019    CHLORIDE 109 03/11/2019    CO2 30 03/11/2019    ANIONGAP 4 03/11/2019    GLC 86 03/11/2019    BUN 28 03/11/2019    CR 1.41 (H) 03/20/2019    GFRESTIMATED 46 (L) 03/20/2019    GFRESTBLACK 53 (L) 03/20/2019    SAI 9.4 03/11/2019        A1C RESULTS:  No results found for: A1C    INR RESULTS:  Lab Results   Component Value Date    INR 1.04 07/24/2006         ECHOCARDIOGRAM  No results found for this or any previous visit (from the past 8760 hour(s)).      No orders of the defined types were placed in this encounter.    No orders of the defined types were placed in this encounter.    There are no discontinued medications.      Encounter Diagnosis   Name Primary?     PVC's (premature ventricular contractions)        Thank you for allowing me to participate in the care of your patient.    Sincerely,     De Jurado MD     [facility]

## 2019-04-16 NOTE — PROGRESS NOTES
Electrophysiology/ Clinic Note         H&P and Plan:     REASON FOR VISIT: Electrophysiology evaluation.      HISTORY OF PRESENT ILLNESS: Mr. Allen is a pleasant 83-year-old gentleman with a history of hypertension, hyperlipidemia, symptomatic PVCs on (PVC ablation 2013, partially successful, on chronic amiodarone therapy), esophageal stricture, previous PE and CVAs (x3) and coronary artery disease (multiple PCI's in 2000 and 2018, CABG in 2004; LIMA to LAD, SVG to ramus and SVG to diagonal and right PDA), who was recently found to have atrial fibrillation and was referred here for evaluation and consideration for watchman procedure.    Patient was seen in the ED in January of this year with flulike symptoms.  At that time he was found to have A. fib.  Later, he follow-up with Dr. Franco in clinic and due to the history of frequent falls and high risk for bleeding, a decision was made not to start oral anticoagulation.  He was referred here for consideration for watchman procedure.      At the moment, he is doing well. He seems to be very limited due to previous strokes and has significant balance issues.  According to his wife, he falls very frequently.  He had at least 10 major falls in the last year.  He was also seen in the ED in April due to another episode of fall.  He is currently taking aspirin and Plavix, and exhibits significant bruising throughout both arms. One of his arms had start bleeding during this visit.    Denies other symptoms such as chest pain, shortness of breath, near-syncope or syncope.      ASSESSMENT AND PLAN:   1.    Atrial fibrillation.  He already had 3 strokes in the past.  His chads vas score is 6.  He is also high risk for bleeding due to balance issues and frequent falls.  I agree with Dr. Franco, he is high risk for complications related to long-term use of oral anticoagulation and may benefit from watchman procedure.     I explained the procedure in details.  They understand that  there is 2-3% complication associated procedure and not everybody has fever anatomy.  Prior to make a decision about the procedure he will require a EDNA to evaluate left atrial appendage anatomy and for stability her for watchman.  I will contact Dr. Franco to discuss plan.  He will need EGD prior to EDNA.    In the meantime, will continue therapy with aspirin, Plavix  and amiodarone.      De Jurado MD    Physical Exam:  Vitals: /55 (BP Location: Right arm, Patient Position: Sitting, Cuff Size: Adult Regular)   Pulse 83   Wt 95.3 kg (210 lb 3.2 oz)   SpO2 98%   BMI 27.73 kg/m      Constitutional:  AAO x3.  Pt is in NAD.  HEAD: normocephalic.  SKIN: Skin normal color, texture and turgor with no lesions or eruptions.  Eyes: PERRL, EOMI.  ENT:  Supple, normal JVP. No lymphadenopathy or thyroid enlargement.  Chest:  CTAB  Cardiac:  RRR, normal  S1 and S2.  No murmurs rubs or gallop.  Systolic murmur in LLSB II/VI.  Abdomen:  Normal BS.  Soft, non-tender and non-distended.  No rebound or guarding.    Extremities:  Pedious pulses palpable B/L.  No LE edema noticed.   Neurological: Strength and sensation grossly symmetric and intact throughout.       CURRENT MEDICATIONS:  Current Outpatient Medications   Medication Sig Dispense Refill     Acetaminophen (TYLENOL 8 HOUR ARTHRITIS PAIN PO) Take 650 mg by mouth every 8 hours as needed       amiodarone (PACERONE/CODARONE) 200 MG tablet Take 0.5 tablets (100 mg) by mouth daily 45 tablet 3     amLODIPine (NORVASC) 2.5 MG tablet Take 1 tablet (2.5 mg) by mouth daily 90 tablet 3     aspirin (ASA) 81 MG EC tablet Take 81 mg by mouth daily       atorvastatin (LIPITOR) 40 MG tablet Take 1 tablet (40 mg) by mouth every evening 90 tablet 3     calcitonin, salmon, (MIACALCIN) 200 UNIT/ACT nasal spray Spray 1 spray into one nostril alternating nostrils daily 1 Bottle 11     calcium carbonate-vitamin D (CALCIUM 500/D) 500-200 MG-UNIT tablet Take 2 tablets by mouth daily        citalopram (CELEXA) 40 MG tablet Take 1 tablet (40 mg) by mouth daily 90 tablet 3     clopidogrel (PLAVIX) 75 MG tablet Take 1 tablet (75 mg) by mouth daily 90 tablet 3     docusate sodium (COLACE) 100 MG capsule Take 200 mg by mouth daily as needed for constipation       furosemide (LASIX) 40 MG tablet Take 1 tablet (40 mg) by mouth daily 90 tablet 3     gabapentin (NEURONTIN) 400 MG capsule Take 1 capsule (400 mg) by mouth 2 times daily 180 capsule 3     nitroGLYcerin (NITROSTAT) 0.4 MG sublingual tablet Place 1 tablet (0.4 mg) under the tongue every 5 minutes as needed 25 tablet 6     oxyCODONE (ROXICODONE) 5 MG tablet Take 1-2 tablets (5-10 mg) by mouth every 4 hours as needed Fill on 19. 90 tablet 0     ranitidine (ZANTAC) 150 MG tablet Take 1 tablet (150 mg) by mouth daily 30 tablet 11       ALLERGIES     Allergies   Allergen Reactions     Hydroxychloroquine Rash     Omeprazole Rash     Pt had plain aspirin and reacted by developing many ulcers down esophagus. By Upper GI test they found the ulcers. Pt is ok to take low grade ecotrin without reaction.       PAST MEDICAL HISTORY:  No past medical history on file.    PAST SURGICAL HISTORY:  No past surgical history on file.    FAMILY HISTORY:  No family history on file.    SOCIAL HISTORY:  Social History     Socioeconomic History     Marital status:      Spouse name: None     Number of children: None     Years of education: None     Highest education level: None   Occupational History     None   Social Needs     Financial resource strain: None     Food insecurity:     Worry: None     Inability: None     Transportation needs:     Medical: None     Non-medical: None   Tobacco Use     Smoking status: Former Smoker     Last attempt to quit: 1952     Years since quittin.3     Smokeless tobacco: Former User   Substance and Sexual Activity     Alcohol use: No     Frequency: Never     Drug use: No     Sexual activity: None   Lifestyle     Physical  activity:     Days per week: None     Minutes per session: None     Stress: None   Relationships     Social connections:     Talks on phone: None     Gets together: None     Attends [employer] service: None     Active member of club or organization: None     Attends meetings of clubs or organizations: None     Relationship status: None     Intimate partner violence:     Fear of current or ex partner: None     Emotionally abused: None     Physically abused: None     Forced sexual activity: None   Other Topics Concern     None   Social History Narrative     None       Review of Systems:  Skin:  Positive for bruising   Eyes:  Positive for glasses  ENT:  Negative    Respiratory:  Positive for dyspnea on exertion  Cardiovascular:    Positive for;chest pain;palpitations;lower extremity symptoms;edema;fatigue;lightheadedness;dizziness  Gastroenterology: Positive for excessive gas or bloating  Genitourinary:  Negative    Musculoskeletal:  Positive for arthritis;joint pain;joint swelling;joint stiffness  Neurologic:  Positive for numbness or tingling of hands;numbness or tingling of feet;memory problems;stroke  Psychiatric:  Positive for anxiety;depression  Heme/Lymph/Imm:  Negative    Endocrine:  Negative        Recent Lab Results:  LIPID RESULTS:  Lab Results   Component Value Date    CHOL 132 03/04/2019    HDL 43 03/04/2019    LDL 64 03/04/2019    TRIG 126 03/04/2019       LIVER ENZYME RESULTS:  Lab Results   Component Value Date    AST 17 03/04/2019    ALT 20 03/04/2019       CBC RESULTS:  Lab Results   Component Value Date    WBC 5.2 03/11/2019    RBC 3.30 (L) 03/11/2019    HGB 10.6 (L) 03/11/2019    HCT 33.0 (L) 03/11/2019     03/11/2019    MCH 32.1 03/11/2019    MCHC 32.1 03/11/2019    RDW 13.5 03/11/2019     03/11/2019       BMP RESULTS:  Lab Results   Component Value Date     03/11/2019    POTASSIUM 4.6 03/11/2019    CHLORIDE 109 03/11/2019    CO2 30 03/11/2019    ANIONGAP 4 03/11/2019    GLC 86  03/11/2019    BUN 28 03/11/2019    CR 1.41 (H) 03/20/2019    GFRESTIMATED 46 (L) 03/20/2019    GFRESTBLACK 53 (L) 03/20/2019    SAI 9.4 03/11/2019        A1C RESULTS:  No results found for: A1C    INR RESULTS:  Lab Results   Component Value Date    INR 1.04 07/24/2006         ECHOCARDIOGRAM  No results found for this or any previous visit (from the past 8760 hour(s)).      No orders of the defined types were placed in this encounter.    No orders of the defined types were placed in this encounter.    There are no discontinued medications.      Encounter Diagnosis   Name Primary?     PVC's (premature ventricular contractions)          CC  Mariah Franco MD  [address]  [place] [id number]

## 2019-04-18 NOTE — NURSING NOTE
"Chief Complaint   Patient presents with     Consult     Skin Ulcer of the left foot, Diabetic, hammertoe as well. lost nail on great right toenail       Initial /64   Pulse 85   Ht 1.854 m (6' 1\")   Wt 95.3 kg (210 lb)   BMI 27.71 kg/m   Estimated body mass index is 27.71 kg/m  as calculated from the following:    Height as of this encounter: 1.854 m (6' 1\").    Weight as of this encounter: 95.3 kg (210 lb).  Medications and allergies reviewed.      Brooklyn TOMPKINS MA    "

## 2019-04-18 NOTE — LETTER
4/18/2019         RE: Haraln Allen  [address]  [place] [address]        Dear Colleague,    Thank you for referring your patient, Harlan Allen, to the [facility]. Please see a copy of my visit note below.    Harlan returns to the office for reevaluation of the left foot.  The patient relates covering the toe with a silicone toe sleeve.  Patient denies any redness or drainage from the ulcer on the tip of the second on the left foot.  The patient relates losing his great toenail on the right great toe.  The patient was seen by cardiology for cardiovascular work-up.  No lower extremity vascular work-up was apparently ordered.    PAST MEDICAL HISTORY: History reviewed. No pertinent past medical history.    BMI= Body mass index is 27.71 kg/m .        Physical Exam:    General: The patient appears to have a pleasant mental affect.    Lower extremity physical exam:  Neurovascular status is unchanged with non-palpable pedal pulses and diminished epicritic sensations.  Muscular exam is within normal limits to major muscle groups.  Integument is intact.      One notes residual edema.  One notes small pinpoint ulceration on the distal aspect of the second toe on the left foot.  Negative probe to bone.  No drainage noted.  No surrounding erythema noted.  The right great toenail is been avulsed with a healthy underlying nail bed.  No surrounding erythema noted.         Assessment:      ICD-10-CM    1. Toe ulcer, left, limited to breakdown of skin (H) L97.521 US Low Ext Arterial Doppler Seg Pres w Exer Erik     CANCELED: XR Ankle Bilateral G/E 3 Views   2. Hallux hammertoe, left M20.32    3. Nail avulsion of toe, initial encounter S91.209A    4. PVD (peripheral vascular disease) (H) I73.9 US Low Ext Arterial Doppler Seg Pres w Exer Erik     CANCELED: XR Ankle Bilateral G/E 3 Views       Plan:  I have explained to Harlan about the conditions.  At this time, the patient will have  noninvasive vascular test ordered for further evaluation of lower extremity vascular status.  The patient will continue dressing the ulceration on the distal aspect second toe and continue wearing the silicone toe sleeve.  The patient will be notified of the vascular results.      Disclaimer: This note consists of symbols derived from keyboarding, dictation and/or voice recognition software. As a result, there may be errors in the script that have gone undetected. Please consider this when interpreting information found in this chart.       BREANN Oliveira D.P.M., F.A.C.F.A.S.      Again, thank you for allowing me to participate in the care of your patient.        Sincerely,        Bharathi Oliveira DPM

## 2019-04-18 NOTE — PROGRESS NOTES
Harlan returns to the office for reevaluation of the left foot.  The patient relates covering the toe with a silicone toe sleeve.  Patient denies any redness or drainage from the ulcer on the tip of the second on the left foot.  The patient relates losing his great toenail on the right great toe.  The patient was seen by cardiology for cardiovascular work-up.  No lower extremity vascular work-up was apparently ordered.    PAST MEDICAL HISTORY: History reviewed. No pertinent past medical history.    BMI= Body mass index is 27.71 kg/m .        Physical Exam:    General: The patient appears to have a pleasant mental affect.    Lower extremity physical exam:  Neurovascular status is unchanged with non-palpable pedal pulses and diminished epicritic sensations.  Muscular exam is within normal limits to major muscle groups.  Integument is intact.      One notes residual edema.  One notes small pinpoint ulceration on the distal aspect of the second toe on the left foot.  Negative probe to bone.  No drainage noted.  No surrounding erythema noted.  The right great toenail is been avulsed with a healthy underlying nail bed.  No surrounding erythema noted.         Assessment:      ICD-10-CM    1. Toe ulcer, left, limited to breakdown of skin (H) L97.521 US Low Ext Arterial Doppler Seg Pres w Exer Erik     CANCELED: XR Ankle Bilateral G/E 3 Views   2. Hallux hammertoe, left M20.32    3. Nail avulsion of toe, initial encounter S91.209A    4. PVD (peripheral vascular disease) (H) I73.9 US Low Ext Arterial Doppler Seg Pres w Exer Erik     CANCELED: XR Ankle Bilateral G/E 3 Views       Plan:  I have explained to Harlan about the conditions.  At this time, the patient will have noninvasive vascular test ordered for further evaluation of lower extremity vascular status.  The patient will continue dressing the ulceration on the distal aspect second toe and continue wearing the silicone toe sleeve.  The patient will be notified of the  vascular results.      Disclaimer: This note consists of symbols derived from keyboarding, dictation and/or voice recognition software. As a result, there may be errors in the script that have gone undetected. Please consider this when interpreting information found in this chart.       BREANN Oliveira D.P.M., [email or web address].

## 2019-04-18 NOTE — TELEPHONE ENCOUNTER
Called patient to give them the number to [facility] ultrasound.  [phone]. Also gave patient the order number [id number], because the first order Dr. Oliveira put in was an XR not and ultrasound.    Brooklyn Bhardwaj MA on 4/18/2019 at 1:06 PM

## 2019-04-24 NOTE — PATIENT INSTRUCTIONS
Thank you for choosing [facility] (formerly [facility]).  You may be receiving an email and/or telephone survey request from [facility] Customer Experience regarding your visit today.  Please take a few minutes to respond to the survey to let us know how we are doing.      If you have questions or concerns, please contact us via [facility] or you can contact your care team at [phone].    Our Clinic hours are:  Monday 6:40 am  to 7:00 pm  Tuesday -Friday 6:40 am to 5:00 pm    The [place] outpatient lab hours are:  Monday - Friday 6:10 am to 4:45 pm  Saturdays 7:00 am to 11:00 am  Appointments are required, call [phone]    If you have clinical questions after hours or would like to schedule an appointment,  call the clinic at [phone].      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 Use Bacitracin antibiotic ointment and dressing til dry and scabbed.   Elevate the feet to reduce swelling. Monitor for infection: redness and swelling and pain and tenderness. If these occur then recheck right away.   Follow up with Dr. Oliveira on 4-26-19. Avoid peroxide.       (M47.9) Osteoarthritis of spine, unspecified spinal osteoarthritis complication status, unspecified spinal region  Comment:   Plan: oxyCODONE (ROXICODONE) 5 MG tablet,       The three months of the written refills were done today for the pain med. These are dated for 5-2-19, 6-1-19 and 7-1-19, for #90 each.   Recheck in three months in the clinic if stable.

## 2019-04-24 NOTE — PROGRESS NOTES
SUBJECTIVE:   Harlan Allen is a 83 year old male who presents to clinic today for the following   health issues:        ED/UC Followup:    Facility:  [facility]  Date of visit: 4-4-19  Reason for visit: Fall, Arm Laceration.  Current Status: They state he is still off balance.  The arm laceration is getting better.  He is having edema for both legs with sores on both lower legs. The edema was worse yesterday where his wife gave him another dose of Lasix around 11:30-12:00.  He has an ulcer on the left second toe.  They have been following this with Dr. Oliveira and has an ultrasound scheduled for 4-26-19.  They feel he needs another antibiotic as the toe is red.        STOOL:  Watery stools with blood, started about 1 1/2 weeks ago.  This is getting worse.  In the past 24 hours he has multiple episodes.  4-5 times just this morning.  He was on an antibiotic recently for his toe ulcer.  They have tried an OTC medication to help with the loose stool and that is not working.    PAIN MEDICATION:  Refill of pain medication.    Osteoarthritis of the spine.  They will use Lidocaine patches as needed.  No side effects to the medication.        Current Outpatient Medications:      Acetaminophen (TYLENOL 8 HOUR ARTHRITIS PAIN PO), Take 650 mg by mouth every 8 hours as needed, Disp: , Rfl:      amiodarone (PACERONE/CODARONE) 200 MG tablet, Take 0.5 tablets (100 mg) by mouth daily, Disp: 45 tablet, Rfl: 3     amLODIPine (NORVASC) 2.5 MG tablet, Take 1 tablet (2.5 mg) by mouth daily, Disp: 90 tablet, Rfl: 3     aspirin (ASA) 81 MG EC tablet, Take 81 mg by mouth daily, Disp: , Rfl:      atorvastatin (LIPITOR) 40 MG tablet, Take 1 tablet (40 mg) by mouth every evening, Disp: 90 tablet, Rfl: 3     calcitonin, salmon, (MIACALCIN) 200 UNIT/ACT nasal spray, Spray 1 spray into one nostril alternating nostrils daily, Disp: 1 Bottle, Rfl: 11     calcium carbonate-vitamin D (CALCIUM 500/D) 500-200 MG-UNIT tablet, Take 2 tablets by mouth  "daily, Disp: , Rfl:      citalopram (CELEXA) 40 MG tablet, Take 1 tablet (40 mg) by mouth daily, Disp: 90 tablet, Rfl: 3     clopidogrel (PLAVIX) 75 MG tablet, Take 1 tablet (75 mg) by mouth daily, Disp: 90 tablet, Rfl: 3     furosemide (LASIX) 40 MG tablet, Take 1 tablet (40 mg) by mouth daily, Disp: 90 tablet, Rfl: 3     gabapentin (NEURONTIN) 400 MG capsule, Take 1 capsule (400 mg) by mouth 2 times daily, Disp: 180 capsule, Rfl: 3     nitroGLYcerin (NITROSTAT) 0.4 MG sublingual tablet, Place 1 tablet (0.4 mg) under the tongue every 5 minutes as needed, Disp: 25 tablet, Rfl: 6     oxyCODONE (ROXICODONE) 5 MG tablet, Take 1-2 tablets (5-10 mg) by mouth every 4 hours as needed Fill on 4-2-19., Disp: 90 tablet, Rfl: 0     ranitidine (ZANTAC) 150 MG tablet, Take 1 tablet (150 mg) by mouth daily, Disp: 30 tablet, Rfl: 11     docusate sodium (COLACE) 100 MG capsule, Take 200 mg by mouth daily as needed for constipation, Disp: , Rfl:     Patient Active Problem List   Diagnosis     Essential hypertension     Hyperlipidemia LDL goal <100     Moderate major depression (H)     ASHD (arteriosclerotic heart disease)     Atrial fibrillation (H)     Blood pressure 100/56, pulse 84, temperature 101.2  F (38.4  C), temperature source Tympanic, resp. rate 20, height 1.854 m (6' 1\"), weight 93.4 kg (206 lb), SpO2 97 %.    Exam:  GENERAL APPEARANCE: healthy, alert and no distress  SKIN: the left second toe has an ulcer about 5 mm wide and 2 mm deep; this is not infected but it is moist.   The right great toe toenail is gone and this is also moist and not infected.   PSYCH: mentation appears normal and affect normal/bright      (E13.621,  L97.529) Ulcer of toe of left foot associated with diabetes mellitus of other type, unspecified ulcer stage (H)  (primary encounter diagnosis)  Comment:   Plan: For the toes, if they wet then keep them covered. Use Bacitracin antibiotic ointment and dressing til dry and scabbed.   Elevate the feet to " reduce swelling. Monitor for infection: redness and swelling and pain and tenderness. If these occur then recheck right away.   Follow up with Dr. Oliveira on 4-26-19. Avoid peroxide.     (M47.9) Osteoarthritis of spine, unspecified spinal osteoarthritis complication status, unspecified spinal region  Comment:   Plan: oxyCODONE (ROXICODONE) 5 MG tablet,       The three months of the written refills were done today for the pain med. These are dated for 5-2-19, 6-1-19 and 7-1-19, for #90 each.   Recheck in three months in the clinic if stable.        Marky Cox

## 2019-04-29 NOTE — TELEPHONE ENCOUNTER
Pt referred to [facility] by Bharathi Oliveira DPM for PVD, left great toe ulcer.    US SEGMENTAL PRESSURES WITH [facility] WITH EXERCISE 4/26/2019 in Epic.  CT abdomen/pelvis with contrast 1/26/19 in [facility].    Pt needs to be scheduled for consult with Vascular Surgery.  Will route to scheduling to coordinate an appointment ASAP.    BEVERLY PerezN RN

## 2019-04-29 NOTE — TELEPHONE ENCOUNTER
Patient has been scheduled for consult on 5/14/19 with Dr. Palomo in [place]. Patient's wife declined earlier offered appts due to location.

## 2019-04-29 NOTE — TELEPHONE ENCOUNTER
Per Dr. Oliveira's request, I contacted the patient; who informed me he was hard of hearing and had me talk to his wife; and informed them that Dr. Oliveira put in a referral for the Vascular department to have them take them a better look at the right leg regarding his CAMRYN results.The right leg has poor circulation and will need to be looked at closer . I gave patient wife the order number [id number] and gave her the phone number [phone], to schedule this.    Brooklyn Bhardwaj MA on 4/29/2019 at 10:03 AM

## 2019-05-02 NOTE — TELEPHONE ENCOUNTER
"S-(situation): I spoke with wife with pt in the background.  Pt is experiencing diarrhea since 4/14/19; 2 1/2 weeks.  Pt wakes up with adult diaper \"full\" of diarrhea.    Then, he has 2-3 more episodes daily.  This has been occurring the entire 2 1/2 weeks.  Pt was on Augmentin for a toe infection from Dr Oliveira 4/4/19.  Wife reports that pt's appetite is very diminished and oral intake is \"very bad.\"    Wife reports that pt is getting weak.  They have been treating with imodium, 2 BID.  No visible blood in stools.  No fever.    B-(background): per above.  Pt is diabetic.    A-(assessment): Ongoing diarrhea x 2 1/2 weeks, 3-4 episodes per day with weakness and poor oral intake.    Recent history of antibiotic.    R-(recommendations): Advised ED for further eval and treat.  Concepcion Holland RN      "

## 2019-05-02 NOTE — ED AVS SNAPSHOT
[facility] Emergency Department  [address] [address]  Phone:  [phone]  Fax:  [phone]                                    Harlan Allen   MRN: [id number]    Department:  [facility] Emergency Department   Date of Visit:  5/2/2019           After Visit Summary Signature Page    I have received my discharge instructions, and my questions have been answered. I have discussed any challenges I see with this plan with the nurse or doctor.    ..........................................................................................................................................  Patient/Patient Representative Signature      ..........................................................................................................................................  Patient Representative Print Name and Relationship to Patient    ..................................................               ................................................  Date                                   Time    ..........................................................................................................................................  Reviewed by Signature/Title    ...................................................              ..............................................  Date                                               Time          22EPIC Rev 08/18

## 2019-05-02 NOTE — TELEPHONE ENCOUNTER
Reason for Call:  Other diarrhea    Detailed comments: Wife calling very concerned as patient has had diarrhea more two weeks.    Phone Number Patient can be reached at: Home number on file [phone] (home)    Best Time: any    Can we leave a detailed message on this number? YES    Call taken on 5/2/2019 at 7:47 AM by Adriane Worthy

## 2019-05-02 NOTE — ED PROVIDER NOTES
"  History     Chief Complaint   Patient presents with     Diarrhea     for a month     HPI  Harlan Allen is a 83 year old male with diarrhea diarrhea x ~ 3 weeks. Occasional blood with diarrhea, but not recently. No abd pain, fever or chills.  Using Imodium with little relief.  He has mild generalized weakness, increased thirst and dry mouth with decreased urine output.  He and his wife are concerned about dehydration.  He was recently on an unknown antibiotic possible left second \"toe ulcer\" (with hammer toe) infection ~ 1 month ago, preceding onset of diarrhea.  No improvement on antibiotics and will be following up with Podiatrist. Previous records show Augmentin rx 4/4/19.  No suspicious bad food ingestion, recent travel or known infectious exposures.  No history of inflammatory bowel disease.  No other acute complaints or concerns.     Allergies:  Allergies   Allergen Reactions     Salicylates Unknown and Other (See Comments)     Pt had plain aspirin and reacted by developing many ulcers down esophagus. By Upper GI test they found the ulcers.  Pt is ok to take low grade ecotrin without reaction.        Hydroxychloroquine Rash     Omeprazole Rash     Pt had plain aspirin and reacted by developing many ulcers down esophagus. By Upper GI test they found the ulcers. Pt is ok to take low grade ecotrin without reaction.       Problem List:    Patient Active Problem List    Diagnosis Date Noted     Atrial fibrillation (H) 03/11/2019     Priority: Medium     Essential hypertension 02/07/2019     Priority: Medium     Hyperlipidemia LDL goal <100 02/07/2019     Priority: Medium     Moderate major depression (H) 02/07/2019     Priority: Medium     ASHD (arteriosclerotic heart disease) 02/07/2019     Priority: Medium        Past Medical History:    No past medical history on file.    Past Surgical History:    No past surgical history on file.    Family History:    No family history on file.    Social History:  Marital " Status:   [2]  Social History     Tobacco Use     Smoking status: Former Smoker     Last attempt to quit: 1952     Years since quittin.3     Smokeless tobacco: Former User   Substance Use Topics     Alcohol use: No     Frequency: Never     Drug use: No        Medications:      Acetaminophen (TYLENOL 8 HOUR ARTHRITIS PAIN PO)   amiodarone (PACERONE/CODARONE) 200 MG tablet   amLODIPine (NORVASC) 2.5 MG tablet   aspirin (ASA) 81 MG EC tablet   atorvastatin (LIPITOR) 40 MG tablet   calcitonin, salmon, (MIACALCIN) 200 UNIT/ACT nasal spray   calcium carbonate-vitamin D (CALCIUM 500/D) 500-200 MG-UNIT tablet   citalopram (CELEXA) 40 MG tablet   clopidogrel (PLAVIX) 75 MG tablet   docusate sodium (COLACE) 100 MG capsule   furosemide (LASIX) 40 MG tablet   gabapentin (NEURONTIN) 400 MG capsule   nitroGLYcerin (NITROSTAT) 0.4 MG sublingual tablet   oxyCODONE (ROXICODONE) 5 MG tablet   ranitidine (ZANTAC) 150 MG tablet       Review of Systems  As mentioned above in the history present illness.  All other systems were reviewed and are negative.    Physical Exam   BP: 146/53  Pulse: 83  Heart Rate: 99  Temp: 98.8  F (37.1  C)  Resp: 18  SpO2: 96 %      Physical Exam   Constitutional: He is oriented to person, place, and time. He appears well-developed and well-nourished. No distress.   HENT:   Head: Normocephalic and atraumatic.   Oral mucosa dry   Eyes: Conjunctivae and EOM are normal. No scleral icterus.   Neck: Normal range of motion. Neck supple. No tracheal deviation present.   Cardiovascular: Normal rate and regular rhythm. Exam reveals no gallop and no friction rub.   Murmur heard.   Systolic murmur is present with a grade of 3/6.  Pulmonary/Chest: Effort normal and breath sounds normal. No respiratory distress. He has no wheezes. He has no rales.   Abdominal: Soft. He exhibits no distension, no abdominal bruit, no pulsatile midline mass and no mass. Bowel sounds are increased. There is no  hepatosplenomegaly. There is no tenderness. There is no rigidity, no rebound, no guarding, no CVA tenderness, no tenderness at McBurney's point and negative Ornelas's sign.   Musculoskeletal: Normal range of motion. He exhibits edema ( Symmetrical 2+ tibial pedal edema bilaterally). He exhibits no tenderness.   Neurological: He is alert and oriented to person, place, and time. Coordination normal.   Skin: Skin is warm and dry. No rash noted. He is not diaphoretic. No erythema. No pallor.   Grade 2 stasis ulcer at the base of the distal left second toe with no cellulitis or abscess.   Psychiatric: He has a normal mood and affect. His behavior is normal.   Nursing note and vitals reviewed.      ED Course        Procedures                 Results for orders placed or performed during the hospital encounter of 05/02/19 (from the past 24 hour(s))   CBC with platelets, differential   Result Value Ref Range    WBC 10.4 4.0 - 11.0 10e9/L    RBC Count 3.64 (L) 4.4 - 5.9 10e12/L    Hemoglobin 11.3 (L) 13.3 - 17.7 g/dL    Hematocrit 35.1 (L) 40.0 - 53.0 %    MCV 96 78 - 100 fl    MCH 31.0 26.5 - 33.0 pg    MCHC 32.2 31.5 - 36.5 g/dL    RDW 13.3 10.0 - 15.0 %    Platelet Count 293 150 - 450 10e9/L    Diff Method Automated Method     % Neutrophils 78.5 %    % Lymphocytes 10.6 %    % Monocytes 6.7 %    % Eosinophils 3.2 %    % Basophils 0.6 %    % Immature Granulocytes 0.4 %    Nucleated RBCs 0 0 /100    Absolute Neutrophil 8.1 1.6 - 8.3 10e9/L    Absolute Lymphocytes 1.1 0.8 - 5.3 10e9/L    Absolute Monocytes 0.7 0.0 - 1.3 10e9/L    Absolute Eosinophils 0.3 0.0 - 0.7 10e9/L    Absolute Basophils 0.1 0.0 - 0.2 10e9/L    Abs Immature Granulocytes 0.0 0 - 0.4 10e9/L    Absolute Nucleated RBC 0.0    Basic metabolic panel   Result Value Ref Range    Sodium 141 133 - 144 mmol/L    Potassium 4.0 3.4 - 5.3 mmol/L    Chloride 104 94 - 109 mmol/L    Carbon Dioxide 32 20 - 32 mmol/L    Anion Gap 5 3 - 14 mmol/L    Glucose 125 (H) 70 - 99  mg/dL    Urea Nitrogen 35 (H) 7 - 30 mg/dL    Creatinine 1.91 (H) 0.66 - 1.25 mg/dL    GFR Estimate 32 (L) >60 mL/min/[1.73_m2]    GFR Estimate If Black 37 (L) >60 mL/min/[1.73_m2]    Calcium 9.1 8.5 - 10.1 mg/dL   Hepatic panel   Result Value Ref Range    Bilirubin Direct 0.2 0.0 - 0.2 mg/dL    Bilirubin Total 0.6 0.2 - 1.3 mg/dL    Albumin 3.1 (L) 3.4 - 5.0 g/dL    Protein Total 7.0 6.8 - 8.8 g/dL    Alkaline Phosphatase 292 (H) 40 - 150 U/L    ALT 16 0 - 70 U/L    AST 21 0 - 45 U/L       Medications   lactated ringers infusion (has no administration in time range)   lactated ringers BOLUS 1,000 mL (0 mLs Intravenous Stopped 5/2/19 1329)   lactated ringers BOLUS 1,000 mL (0 mLs Intravenous Stopped 5/2/19 1415)       Assessments & Plan (with Medical Decision Making)   Early patient with diarrhea for approximately 3 weeks after Augmentin therapy for possible left second toe infection which is stable and unchanged by antibiotic therapy.  He appears dehydrated, their primary concern for coming to the ED today.  Diarrhea 3 or 4 times a day despite use of Imodium.  No fever, chills, vomiting or abdominal pain.  No signs or symptoms of GI bleeding at present time.  Benign-appearing stool sent for evaluation.  Will defer treatment of probable C. difficile enteritis pending stool results.  He and his wife were provided instructions for supportive care and will return as needed for worsened condition or worsening symptoms, or new problems or concerns.    I have reviewed the nursing notes.    I have reviewed the findings, diagnosis, plan and need for follow up with the patient.       Medication List      Imodium OTC as needed         Final diagnoses:   Diarrhea, unspecified type   Dehydration       5/2/2019   [facility] EMERGENCY DEPARTMENT     Roge Fontaine MD  05/02/19 9522

## 2019-05-02 NOTE — TELEPHONE ENCOUNTER
"[facility] Emergency Department Lab result notification [Adult-Male]    [facility] ED lab result protocol used  C Difficile    Reason for call  Notify of lab results, assess symptoms,  review ED providers recommendations/discharge instructions (if necessary) and advise per ED lab result f/u protocol    Lab Result (including Rx patient on, if applicable)  Final Clostridium difficile toxin B PCR is POSITIVE.  Toxin producing Clostridium difficile target DNA sequences detected, presumed positive for Clostridium difficile toxin B.   Rx (Flagyl or Vancomycin) prescribed upon discharge from the [facility] ED/UC: None  If positive for C diff toxin B and treated appropriately, notify patient of results.  If No Rx, advise and/or treat per [facility] ED Lab Result protocol.    Information table from ED Provider visit on 5/2/19  Symptoms reported at ED visit (Chief complaint, HPI) Patient presents with     Diarrhea       for a month      HPI  Harlan Allen is a 83 year old male with diarrhea diarrhea x ~ 3 weeks. Occasional blood with diarrhea, but not recently. No abd pain, fever or chills.  Using Imodium with little relief.  He has mild generalized weakness, increased thirst and dry mouth with decreased urine output.  He and his wife are concerned about dehydration.  He was recently on an unknown antibiotic possible left second \"toe ulcer\" (with hammer toe) infection ~ 1 month ago, preceding onset of diarrhea.  No improvement on antibiotics and will be following up with Podiatrist. Previous records show Augmentin rx 4/4/19.  No suspicious bad food ingestion, recent travel or known infectious exposures.  No history of inflammatory bowel disease.  No other acute complaints or concerns.      Significant Medical hx, if applicable (i.e. CKD, diabetes) IBD   Allergies Allergies   Allergen Reactions     Salicylates Unknown and Other (See Comments)     Pt had plain aspirin and reacted by developing many ulcers down " esophagus. By Upper GI test they found the ulcers.  Pt is ok to take low grade ecotrin without reaction.        Hydroxychloroquine Rash     Omeprazole Rash     Pt had plain aspirin and reacted by developing many ulcers down esophagus. By Upper GI test they found the ulcers. Pt is ok to take low grade ecotrin without reaction.      Weight, if applicable Wt Readings from Last 2 Encounters:   04/24/19 93.4 kg (206 lb)   04/18/19 95.3 kg (210 lb)      Coumadin/Warfarin [Yes /No] No. Plavix.    Creatinine Level (mg/dl) Creatinine   Date Value Ref Range Status   05/02/2019 1.91 (H) 0.66 - 1.25 mg/dL Final      Creatinine clearance (ml/min), if applicable Serum creatinine: 1.91 mg/dL (H) 05/02/19 1131  Estimated creatinine clearance: 40.1 mL/min (A)   ED providers Impression and Plan (applicable information) Early patient with diarrhea for approximately 3 weeks after Augmentin therapy for possible left second toe infection which is stable and unchanged by antibiotic therapy.  He appears dehydrated, their primary concern for coming to the ED today.  Diarrhea 3 or 4 times a day despite use of Imodium.  No fever, chills, vomiting or abdominal pain.  No signs or symptoms of GI bleeding at present time.  Benign-appearing stool sent for evaluation.  Will defer treatment of probable C. difficile enteritis pending stool results.  He and his wife were provided instructions for supportive care and will return as needed for worsened condition or worsening symptoms, or new problems or concerns.      ED diagnosis Final diagnoses:   Diarrhea, unspecified type   Dehydration      ED provider Roge Fontaine MD  05/02/19 [id number]      RN Assessment (Patient s current Symptoms), include time called.  [Insert Left message here if message left]  5:03 pm No answer after over 10 rings.     PCP follow-up Questions asked: NO    Zulema Abad RN  [facility] RN  Lung Nodule and ED Lab Result F/u RN  Epic pool (ED late result f/u RN): AUGUST  [id number]  # [phone]

## 2019-05-02 NOTE — ED NOTES
Diarrhea for approximately 1 month and ulcer on second toe on left foot. Patient was on antibiotic for toe ulcer but has been done with antibiotics for a couple of weeks.  Patient denies abdominal pain and nausea.  Claudio Higginbotham RN on 5/2/2019 at 11:22 AM

## 2019-05-03 NOTE — RESULT ENCOUNTER NOTE
Final Enteric Bacteria and Virus Panel by [facility] Stool is NEGATIVE for Campylobacter group, Salmonella species, Shigella species, Shiga toxin 1 gene, Shiga toxin 2 gene, Vibrio group,  Yersinia enterocolitica,  Rotavirus A,  and Norovirus I and II.    No treatment or change in treatment per [facility] Lab Result protocol.

## 2019-05-03 NOTE — TELEPHONE ENCOUNTER
"[facility] Emergency Department Lab result notification     Patient/parent Name  Harlan and his wife    RN Assessment (Patient s current Symptoms), include time called.  [Insert Left message here if message left]  At 10:30A, per wife, he had one diarrhea this morning.  No further diarrhea.  I gave him some immodium this morning.\"  Per wife he is drinking fluids today.      Lab result (if applicable):  Final Clostridium difficile toxin B PCR is POSITIVE.  Toxin producing Clostridium difficile target DNA sequences detected, presumed positive for Clostridium difficile toxin B.   Rx (Flagyl or Vancomycin) prescribed upon discharge from the [facility]/: None  If positive for C diff toxin B and treated appropriately, notify patient of results.  If No Rx, advise and/or treat per [facility] Lab Result protocol    RN Recommendations/Instructions per [facility] lab result protocol  Notified of result and treatment recommendations.  He is not on any other antibiotics at this time.  Treated with Vancomycin 125 mg PO QID for 14 days per protocol.    Advised to hold off on taking immodium while he has this infection.  Stress the importance of handwashing.  Advised appt with PCP within 1 to 2 weeks and transferred to scheduling     Please Contact your PCP clinic or return to the Emergency department if your:    Symptoms do not improve after 3 days on antibiotic.    Symptoms do not resolve after completing antibiotic.    Symptoms worsen or other concerning symptom's.    PCP follow-up Questions asked: YES       [RN Name]  Woody Hillman RN  [facility] RN  Lung Nodule and ED Lab Result RN  Epic pool (ED late result f/u RN): [id number]  [facility] INCIDENTAL RADIOLOGY F/U NURSES: [id number]  # [phone]    "

## 2019-05-03 NOTE — RESULT ENCOUNTER NOTE
Notified of result and treatment recommendations.  He is not on any other antibiotics at this time.  Treated with Vancomycin 125 mg PO QID for 14 days per protocol.    See telephone encounter

## 2019-05-14 PROBLEM — L97.522: Status: ACTIVE | Noted: 2019-01-01

## 2019-05-14 NOTE — PROGRESS NOTES
VASCULAR SURGERY CLINIC CONSULTATION    VASCULAR SURGEON: Justen Palomo MD    LOCATION:  [facility]    Harlan Allen   Medical Record #:  [id number]  YOB: 1935  Age:  83 year old     Date of Service: 5/14/2019    PRIMARY CARE PROVIDER: Marky Cox      Reason for visit:  Left 2nd toe wound.    IMPRESSION:  82 yo male with a left hammer toe that has a 4 month old wound that won't heal with severe PAD in the left leg.     RECOMMENDATION:  I've discussed the results of the ABIs with toe pressure with Harlan and his wife.  It appears that the toe pressure is marginal at 41 mmHg on the left side for adequate healing potential.  I recommend at this time that we proceed with a left lower extremity angiogram to evaluate and possibly treat significant arterial stenosis to improve blood flow to left foot for adequate healing after partial limitation of the toe.  Both Flynn and his wife in agreement with this plan would like to proceed with angiogram.  Discussed risks and benefits including embolization, bleeding, centimeters him, infection, worsening of ischemia, kidney injury, exposure to radiation.    HPI:  Harlan Allen is a 83 year old male who was seen today in consultation as requested by Dr. Cox regarding a left second toe ulceration on the tip of the hammertoe has been present for 4 months.  The patient was on course of antibiotics for infection in the toe continues to not heal.  The anabolic's unfortunately caused him to develop C. difficile infection for which he was treated for the last month.  He is not a diabetic and they both describe that the ulcer started at the tip of the toe because of his hammertoe deformity which is consistent with pressure ulceration.  He walks at home with the assistance of a walker he is unsteady on his feet and has had several falls but still lives independently with his wife.  They are very interested in  dealing with the problem as it causes him chronic pain and discomfort in the left second toe.    PHH:  A. Fib, Hyperlipidemia, hyperstension, history of major depression.    ALLERGIES:  Salicylates; Hydroxychloroquine; and Omeprazole    MEDS:    Current Outpatient Medications:      Acetaminophen (TYLENOL 8 HOUR ARTHRITIS PAIN PO), Take 650 mg by mouth every 8 hours as needed, Disp: , Rfl:      amiodarone (PACERONE/CODARONE) 200 MG tablet, Take 0.5 tablets (100 mg) by mouth daily, Disp: 45 tablet, Rfl: 3     amLODIPine (NORVASC) 2.5 MG tablet, Take 1 tablet (2.5 mg) by mouth daily, Disp: 90 tablet, Rfl: 3     aspirin (ASA) 81 MG EC tablet, Take 81 mg by mouth daily, Disp: , Rfl:      atorvastatin (LIPITOR) 40 MG tablet, Take 1 tablet (40 mg) by mouth every evening, Disp: 90 tablet, Rfl: 3     calcitonin, salmon, (MIACALCIN) 200 UNIT/ACT nasal spray, Spray 1 spray into one nostril alternating nostrils daily, Disp: 1 Bottle, Rfl: 11     calcium carbonate-vitamin D (CALCIUM 500/D) 500-200 MG-UNIT tablet, Take 2 tablets by mouth daily, Disp: , Rfl:      citalopram (CELEXA) 40 MG tablet, Take 1 tablet (40 mg) by mouth daily, Disp: 90 tablet, Rfl: 3     clopidogrel (PLAVIX) 75 MG tablet, Take 1 tablet (75 mg) by mouth daily, Disp: 90 tablet, Rfl: 3     docusate sodium (COLACE) 100 MG capsule, Take 200 mg by mouth daily as needed for constipation, Disp: , Rfl:      furosemide (LASIX) 40 MG tablet, Take 1 tablet (40 mg) by mouth daily, Disp: 90 tablet, Rfl: 3     gabapentin (NEURONTIN) 400 MG capsule, Take 1 capsule (400 mg) by mouth 2 times daily, Disp: 180 capsule, Rfl: 3     Lactobacillus (FLORAJEN ACIDOPHILUS PO), , Disp: , Rfl:      nitroGLYcerin (NITROSTAT) 0.4 MG sublingual tablet, Place 1 tablet (0.4 mg) under the tongue every 5 minutes as needed, Disp: 25 tablet, Rfl: 6     ranitidine (ZANTAC) 150 MG tablet, Take 1 tablet (150 mg) by mouth daily, Disp: 30 tablet, Rfl: 11     vancomycin (VANCOCIN) 125 MG capsule,  Take 1 capsule (125 mg) by mouth 4 times daily for 14 days, Disp: 56 capsule, Rfl: 0     oxyCODONE (ROXICODONE) 5 MG tablet, Take 1-2 tablets (5-10 mg) by mouth every 4 hours as needed Fill on 7-1-19. (Patient not taking: Reported on 5/14/2019), Disp: 90 tablet, Rfl: 0    SOCIAL HABITS:    History   Smoking Status     Former Smoker     Quit date: 1/1/1952   Smokeless Tobacco     Former User     Social History    Substance and Sexual Activity      Alcohol use: No        Frequency: Never      History   Drug Use No       FAMILY HISTORY:  No family history on file.    REVIEW OF SYSTEMS:    A 12 point ROS was reviewed and except for what is listed in the HPI above, all others are negative    PE:  /51 (BP Location: Left arm, Patient Position: Chair, Cuff Size: Adult Regular)   Pulse 86   Resp 18   Wt Readings from Last 1 Encounters:   04/24/19 93.4 kg (206 lb)     There is no height or weight on file to calculate BMI.    EXAM:  GENERAL: This is a well-developed 83 year old male who appears his stated age  EYES: Grossly normal.  MOUTH: Buccal mucosa normal   CARDIAC:  NS1 S2, No Murmur  CHEST/LUNG:  Clear lung fields bilaterally   GASTROINTESINAL (ABDOMEN): Soft, non-tender, B/S present, no pulsatile mass  MUSCULOSKELETAL: Grossly normal and both lower extremities are intact.  HEME/LYMPH: No lymphedema  NEUROLOGIC: Focally intact, Alert and oriented x 3.   PSYCH: appropriate affect  INTEGUMENT: Left second toe with 1 cm ulceration at the tip of the toe that is full thickness and may involve the distal phalanx.  There is no erythema and minimal drainage.  He is tender in this location and the remainder of the tip of the toe appears dusky.     Pulse Exam:     Radial: Left 2   Right  2    Femoral: Left 2   Right  2    DP: Left 0   Right  0     PT:   Left 0   Right  0          DIAGNOSTIC STUDIES:     Images:  Us Segmental Pressures W Ppg W Exercise    Result Date: 4/26/2019  US SEGMENTAL PRESSURES WITH PPG WITH  EXERCISE 4/26/2019 12:28 PM HISTORY: 83-year-old patient with left toe ulcer and peripheral arterial disease. COMPARISON: None. Patient had CT angiogram of the abdomen and pelvis on January 26, 2019. FINDINGS: Resting CAMRYN in the right lower extremity is 0.35. Toe brachial index is 0.44. There is a significant drop in ratio from the calf to ankle level where calf value is 0.99 and ankle level is 0.35. The left resting CAMRYN value was 0.83 with toe brachial index of 0.31. There is mild decrease from the calf to ankle in index measuring from 1.17-0.83 respectively. Also significant drop from the ankle to the digit ranging from 0.83 at the PT an 0.31 in the digit. Both femoral and popliteal arterial waveforms are triphasic. Both posterior tibial and dorsalis pedis waveforms are predominantly monophasic. Patient exercised by doing foot pumps in bed for 5 minutes. No symptoms reported. Postexercise CAMRYN values are 0.66 on the right and 0.93 on the left.     IMPRESSION: 1. Severe arterial insufficiency in the right lower extremity with majority of disease throughout the lower calf. 2. At least mild arterial insufficiency in the left lower extremity with distribution predominantly in the lower leg. RAHUL JOHNSON MD      I personally reviewed the images and my interpretation is severe bilateral lower leg PAD concerning for significant infra-popliteal disease.     LABS:      Sodium   Date Value Ref Range Status   05/07/2019 142 133 - 144 mmol/L Final   05/02/2019 141 133 - 144 mmol/L Final   03/11/2019 143 133 - 144 mmol/L Final     Urea Nitrogen   Date Value Ref Range Status   05/07/2019 33 (H) 7 - 30 mg/dL Final   05/02/2019 35 (H) 7 - 30 mg/dL Final   03/11/2019 28 7 - 30 mg/dL Final     Hemoglobin   Date Value Ref Range Status   05/02/2019 11.3 (L) 13.3 - 17.7 g/dL Final   03/11/2019 10.6 (L) 13.3 - 17.7 g/dL Final   01/26/2019 11.8 (L) 13.3 - 17.7 g/dL Final     Platelet Count   Date Value Ref Range Status   05/02/2019  293 150 - 450 10e9/L Final   03/11/2019 153 150 - 450 10e9/L Final   01/26/2019 152 150 - 450 10e9/L Final     INR   Date Value Ref Range Status   07/24/2006 1.04 0.86 - 1.14 Final         Justen Palomo MD  VASCULAR SURGERY

## 2019-05-14 NOTE — NURSING NOTE
"Initial /51 (BP Location: Left arm, Patient Position: Chair, Cuff Size: Adult Regular)   Pulse 86   Resp 18  Estimated body mass index is 27.18 kg/m  as calculated from the following:    Height as of 4/24/19: 1.854 m (6' 1\").    Weight as of 4/24/19: 93.4 kg (206 lb). .    Patient is here for a consult for PAD.   vita taylor LPN    "

## 2019-05-15 PROBLEM — I25.708 CORONARY ARTERY DISEASE OF BYPASS GRAFT OF NATIVE HEART WITH STABLE ANGINA PECTORIS (H): Status: ACTIVE | Noted: 2019-01-01

## 2019-05-15 PROBLEM — I50.32 CHRONIC DIASTOLIC HEART FAILURE (H): Status: ACTIVE | Noted: 2019-01-01

## 2019-05-15 PROBLEM — I73.9 PERIPHERAL VASCULAR DISEASE (H): Status: ACTIVE | Noted: 2019-01-01

## 2019-05-15 PROBLEM — I35.0 AORTIC VALVE STENOSIS, ETIOLOGY OF CARDIAC VALVE DISEASE UNSPECIFIED: Status: ACTIVE | Noted: 2019-01-01

## 2019-05-15 PROBLEM — R60.0 PERIPHERAL EDEMA: Status: ACTIVE | Noted: 2019-01-01

## 2019-05-15 NOTE — PATIENT INSTRUCTIONS
"[facility] HEART CARE  [facility]~[address]~[place], [place]~[address]  Thank you for your [facility] visit today. If you have questions regarding your visit, please contact your cardiology RN's, Deysi Santiago or Abby Braun, at [phone]. Your provider has recommended the following:  Medication Changes:  STOP amlodipine   START ranexa 500 mg twice a day - call us and don't  if too expensive   INCREASE lasix to 60 mg daily   Recommendations:   1. Get athletic compression stockings   2. Check daily weights and call the clinic if your weight has increased more than 2 lbs in one day or 5 lbs in one week.   3.  Call GI provider and ask if it is ok to have a EDNA (echo down the throat)  4. Call if lightheadedness or chest pain   Follow-up:  1. nonfasting lab work (BMP and BNP) in [facility]   2. See Dr. Franco for cardiology follow up at [facility]: 6/10/19    To schedule a future appointment, we kindly ask that you call cardiology scheduling at [phone] three months prior to requested revisit date.      [facility] cardiology clinic is staffed with \"Advance Practice Providers\". These are our cardiology Physician Assistants and Nurse Practitioners.   Please call cardiology scheduling if you feel you need clinical evaluation with them at any time for any cardiac reason.   Reminder:  For your safety, we ask that you bring in your current medication(s) or an updated list of your medications with you to EACH office visit. Include the medication name, dose of pill on bottle and how you are taking it. Include over-the-counter medications or supplements. Your provider will review this at each visit and plan your care based on your current information.   ~~~~~~~~~~~~~~~~~~~~~~~~~~~~~~~~~~~~~~~  \"[facility]\" [place] telephone numbers for reference:  Cardiology Scheduling~[phone]  Diagnostic Imaging Scheduling~[phone]  Lab " "Scheduling~[phone]  Anticoagulation Clinic~[phone]  Cardiac Rehabilitation~[phone]  [facility] RN's~[phone] (at [facility])  Cardiology Clinic RN's~[phone] (Abby Braun, RN & Deysi Santiago RN)  ~~~~~~~~~~~~~~~~~~~~~~~~~~~~~~~~~~~~~~~~       You have been diagnosed with \"Heart Failure.\" This does not mean the heart is about to stop working, rather it means the heart's pumping power is weaker than normal. You may hear different terms that mean the same thing. Some terms you may hear are: Cardiomyopathy, Diastolic Dysfunction, Systolic Dysfunction, Congestive Heart Failure.   Heart failure is a condition that is manageable by paying attention to your signs and symptoms and treating them before they get too severe.   Below, you will find information that will help you learn how to identify these signs and symptoms.   If you have questions, you should write these down and bring them to your next visit so they can be addressed.   If you have urgent questions and would like to speak with a cardiology RN, please call [phone].      Left- or Right- Side Congestive Heart Failure (CHF)    The heart is a large muscle that acts as a pump to circulate blood throughout the body. Blood carries oxygen to all of the organs, including the brain, muscles, and skin. After your body takes the oxygen out of the blood, the blood returns to the heart. The right side of the heart collects the blood from the body and pumps it to the lungs. In the lungs, it gets fresh oxygen and gives up carbon dioxide. The oxygen-rich blood from the lungs then returns to the left side of the heart, where it is pumped back out to the rest of your body, starting the process all over.  Congestive heart failure (CHF) occurs when the heart muscle does not function normally, leading to fluid retention or reduces blood flow. This can be caused by heart muscle weakness or stiffness, or a heart valve problem. Heart failure can affect the " right side of the heart or the left side. But heart failure may affect not only the right side of the heart or only the left side. Although it may have started on one side, it can and often eventually does affect both sides.  Right-side heart failure  When the right side of the heart is failing, it can t handle the blood it is getting from the rest of the body. This blood returns to the heart through veins. When too much pressure builds up in the veins, fluid leaks out into the tissues. Gravity then causes that fluid to move to those parts of the body that are the lowest. So one of the first symptoms of right-side CHF can include swelling in the feet and ankles. If the condition gets worse, the swelling can even go up past the knees. Sometimes it gets so severe, the liver and intestines can get congested as well.  Left-side heart failure  When the left side of the heart is failing, it can t handle the blood it gets from the lungs. Pressure then builds up in the veins of the lungs, causing fluid to leak into the lung tissues. This may cause CHF and pulmonary edema. This causes you to feel short of breath, weak, or dizzy. These symptoms are often worse with exertion, such as when climbing stairs or walking up hills. Lying with your head flat is uncomfortable and can make your breathing worse. This may make sleeping difficult. You may need to use extra pillows to elevate your upper body to sleep well. The same is true when just resting during the daytime. You may also feel weak or tired and have less energy during exertion.  There are many causes of heart failure including:    Coronary artery disease    Past heart attack (also known as acute myocardial infarction, or AMI)    High blood pressure    Damaged heart valve    Diabetes    Obesity    Cigarette smoking    Alcohol abuse  Heart failure is usually a chronic condition. The purpose of medical treatment is to improve the pumping action of the heart and to remove  excess water from the body. A number of medicines can help reach this goal, improve symptoms, and prevent the heart from becoming weaker. Sometimes, heart failure can become so severe that a device is placed in the heart to help with pumping. Another major goal is to better treat the causes of heart failure, such as diabetes and high blood pressure, by making changes in your lifestyle and maximizing medical control when needed.  Home care  Follow these guidelines when caring for yourself at home:    Check your weight every day. This is very important because a sudden increase in weight gain could mean worsening heart failure. Keep these things in mind:  ? Use the same scale every day.  ? Weigh yourself at the same time every day.  ? Make sure the scale is on a hard floor surface, not on a rug or carpet.  ? Keep a record of your weight every day so your healthcare provider can see it. If you are not given a log sheet for this, keep a separate journal for this purpose.     Cut back on the amount of salt (sodium) you eat. Follow your healthcare provider's recommendation on how much salt or sodium you should have each day.  ? Limit high-salt foods. These include olives, pickles, smoked meats, salted potato chips, and most prepared foods.  ? Don't add salt to your food at the table. Use only small amounts of salt when cooking.  ? Read the labels carefully on food packages to learn how much salt or sodium is in each serving in the package. Remember, a can or package of food may contain more than 1 serving. So if you eat all the food in the package, you may be getting more salt than you think.    Follow your healthcare provider's recommendations about how much fluid you should have. Be aware that some foods, such as soup, pudding, and juicy fruits like oranges or melons, contain liquid. You'll need to count the liquid in those foods as part of your daily fluid intake. Your provider can help you with this.    Stop  smoking.    Cut back on how much alcohol you drink.    Lose weight if you are overweight. The excess weight adds a lot of stress on the workload of the heart.    Stay active. Talk with your provider about an exercise program that is safe for your heart.    Keep your feet elevated to reduce swelling. Ask your provider about support hose as a preventive treatment for daytime leg swelling.  Besides taking your medicine as instructed, an important part of treatment is lifestyle changes. These include diet, physical activity, stopping smoking, and weight control.  Improve your diet by including more fresh foods, cutting back on how much sugar and saturated fat you eat, and eating fewer processed foods and less salt.  Follow-up care  Follow up with your healthcare provider, or as advised.  Make sure to keep any appointments that were made for you. These can help better control your congestive heart failure. You will need to follow up with your provider on a routine basis to make sure your heart failure is well managed.  If an X-ray, electrocardiogram (ECG), or other tests were done, you will be told of any new findings that may affect your care.  Call 911  Call 911 if you:        * Become severely short of breath        * Feel lightheaded, or feel like you might pass out or faint        * Have chest pain or discomfort that is different than usual, medicines your doctor            told you to use for this don't help or the pain lasts longer than 10 to 15 minutes        * You suddenly develop a rapid heart rate   When to seek medical advice  The following may be signs that your heart failure is getting worse. Call your healthcare provider right away if any of these happen:    Sudden weight gain. This means 3 or more pounds in one day, or 5 or more pounds in 1 week    Trouble breathing not related to being active    New or increased swelling of your legs or ankles    Swelling or pain in your abdomen    Breathing trouble at  night. This means waking up short of breath or needing more pillows to breathe.    Frequent coughing that doesn t go away    Feeling much more tired than usual  Date Last Reviewed: 5/1/2018 2000-2018 [employer]. [address] [place] [address], [place] [address]. All rights reserved. This information is not intended as a substitute for professional medical care. Always follow your healthcare professional's instructions.     Heart Failure: Warning Signs of a Flare-Up    You have a condition called heart failure. Once you have heart failure, flare-ups can happen. Below are signs that can mean your heart failure is getting worse. If you notice any of these warning signs, call your healthcare provider.  Swelling    Your feet, ankles, or lower legs get puffier.    You notice skin changes on your lower legs.    Your shoes feel too tight.    Your clothes are tighter in the waist.    You have trouble getting rings on or off your fingers.  Shortness of breath    You have to breathe harder even when you re doing your normal activities or when you re resting.    You are short of breath walking up stairs or even short distances.    You wake up at night short of breath or coughing.    You need to use more pillows or sit up to sleep.    You wake up tired or restless.  Other warning signs    You feel weaker, dizzy, or more tired.    You have chest pain or changes in your heartbeat.    You have a cough that won t go away.    You can t remember things or don t feel like eating.  Tracking your weight  Gaining weight is often the first warning sign that heart failure is getting worse. Gaining even a few pounds can be a sign that your body is retaining excess water and salt. Weighing yourself each day in the morning after you urinate and before you eat, is the best way to know if you're retaining water. Get a scale that is easy to read and make sure you wear the same clothes and use the same scale every time you weigh. Your  healthcare provider will show you how to track your weight. Call your doctor if you gain more than 2 pounds in 1 day, 5 pounds in 1 week, or whatever weight gain you were told to report by your doctor. This is often a sign of worsening heart failure and needs to be evaluated and treated before it compromises your breathing. Your doctor will tell you what to do next.    Date Last Reviewed: 3/15/2016    7633-6690 [employer]. [address] [place] [address], [place] [address]. All rights reserved. This information is not intended as a substitute for professional medical care. Always follow your healthcare professional's instructions.      WEIGHT CHART for Patients with Heart Failure  Instructions: Weigh yourself every morning at the same time, on the same scale and in the same clothing. Write your weight on the weight chart. Bring this chart with you to your cardiology clinic visits. Call us if:     You gain more than 2 pounds in one day or 5 pounds in one week.    Have increased shortness of breath.    Wake up short of breath or cannot sleep lying down.    Have increased swelling in your legs, ankles or abdomen (belly).     SUN MON TUES WED THURS FRI SAT   Date          Weight          Date          Weight          Date          Weight          Date          Weight          Date          Weight          Date          Weight          Date          Weight          Date          Weight          Date          Weight          Date          Weight          Date          Weight            Tips for a Low-Sodium Diet  If you have high blood pressure, heart failure, liver problems or kidney problems, you may need to watch your sodium intake. Too much sodium can cause thirst and shortness of breath. It can also make your body retain extra fluids. You should limit the amount of sodium in your diet  to _________ mg per day. Sodium is found in many foods. Most of our sodium comes from  processed  foods like canned soups,  lunch meats and TV dinners.  A major source of sodium is salt, or sodium chloride. Salt is often used to preserve foods (extend their shelf life). We have gotten used to the taste of salt in our foods. When you start to limit your salt intake, you will notice the lack of salt. Give yourself time to adjust to the change.  Tips    To keep track of your sodium intake, write down the amount of sodium you eat for a of couple days. This gives you a good idea of which foods are high in sodium--and where you can cut back.    Do not add salt while cooking or at the table. In recipes, you can often use half the amount of salt without giving up flavor.    Do not add salt to water when making rice, pasta or potatoes.    Do not use lemon pepper--this is made with salt.    Use spices and herbs without the word  salt  in their names. Use herb blends like Mrs. Bernstein.    When eating vegetables, meats, poultry or fish, choose fresh or frozen instead of canned foods.    Eat more homemade foods that are made from scratch. Avoid boxed rice, noodles or potato dishes--these often contain salty seasonings.    Choose foods with the least amount of packaging. These are often lower in sodium.    Read food labels to learn the sodium content for suggested serving sizes. Choose foods with the least amount of sodium.  - Choose foods labeled  low sodium.  These have less than 140 mg of sodium per serving.  - Always double-check serving sizes. If you eat two servings of a food, you will get twice as much sodium.    When you go out to eat, ask that foods be made without added salt. Ask for condiments on the side, so you can control how much you use.    You will find foods with less sodium if you shop along the outer walls of the grocery store.    Do not use a salt substitute without your doctor s okay. Some products (like Polanco Lite Salt) contain potassium. If you are taking certain medicines, these products could raise the level of potassium in  your blood.  High-sodium foods    Salt or kosher salt (one teaspoon = 2,300 mg of sodium)    Seasonings (lemon pepper, garlic salt, sea salt, seasoning salt, etc.)    Meat tenderizers and marinades    Packaged sauces or gravies    Snack foods (chips, crackers, pork rinds, salted nuts)    Cheese (natural and processed)    Cottage cheese    Fast-food and restaurant meals    Most canned vegetables and vegetable juices    Pickled and cured foods (pickles, olives, sauerkraut)    Condiments (BBQ sauce, soy sauce, teriyaki sauce, steak sauce, salad dressing, ketchup, etc.)    Canned or boxed side dishes, such as macaroni and cheese, ramen noodles, Hamburger [place] and Rice-A-Jose    Frozen meals with more than 500 mg of sodium per serving    Monosodium glutamate (MSG)    Processed meats (bologna, ham, blanton) and  canned meats (SPAM, corned beef)    Soups and bouillon (canned, frozen or dried)    Canned tomato products (juice, spaghetti sauce, etc.)    Sports drinks such as Gatorade or Powerade    Foods covered in sauce, gravy or other coatings (broccoli with cheese sauce, chicken fingers, etc.)    Biscuits and refrigerated rolls or breads  For informational purposes only. Not to replace the advice of your health care provider.  Copyright   2005 [facility]. All rights reserved. [employer] [id number] - REV 01/16.

## 2019-05-15 NOTE — PROGRESS NOTES
Cardiology Clinic Progress Note  Harlan Allen MRN# [id number]   YOB: 1935 Age: 83 year old     Reason For Visit: Heart failure f/u   Primary Cardiologist:   Dr. Franco           History of Presenting Illness:    Harlan Allen is a pleasant 83 year old patient with a past cardiac history significant for CAD with CABG,  PVCs, diastolic heart failure, aortic stenosis, and paroxysmal atrial fibrillation.  Past medical history significant for PE, 3 strokes with most recent being 2018 (now on chronic Plavix), and esophageal strictures requiring dilations.     He has a history of multiple stents in the early 2000's and eventually underwent CABG ×3 in 2004 at [facility]. History of symptomatic high burden of PVCs in 2013  S/P PVC ablation with improvement of burden down to 5% and maintained on amiodarone. In regards to his history of stroke, his prior heart monitors did not show any atrial fibrillation.  However, in January 2019 he was found to have new onset atrial fibrillation on EKG and was started on anticoagulation.    Patient saw Dr. Franco on 3/4/2019 in consultation to establish care. He was doing well from a cardiac standpoint.  His primary issue was gait instability with history of falls.  He had chronic lower extremity edema. EP consultation to discuss watchman was recommended along with echocardiogram to reassess aortic stenosis.    Pt was last seen by Dr. Jurado on 4/16/2019.  He was in agreement that the patient would be a good candidate for watchman device given his high risk for falls and bleeds.  It was noted that he would need an EGD prior to preprocedure EDNA, due to his history of esophageal strictures.     Pt presents today for heart failure follow-up. PFTs for amiodarone testing were normal but they're unable to complete lung volumes. Echocardiogram 3/13/2019 showing LVEF 60%, RV normal size and function, moderate TR, RVSP 32 mmHg, mild aortic stenosis with mean gradient 11 mmHg  and V-max 2.2 m/s, dilated IVC, compared to prior echocardiogram from 2016 there was mild aortic stenosis with mean gradient of 15 mmHg and V-max 2.6 m/s. BNP was elevated at 3800.  BMP showing normal electrolytes with renal insufficiency creatinine 1.58 BUN 33 GFR 40. Baseline creatinine since January 2019 looks to be between 1.4 and 1.6. These results were reviewed with him today.    His weight in March and February was 210 pounds and today at our visit is 206 pounds. Given his elevated N-terminal proBNP and dilated IVC on echocardiogram, he is agreeable to increasing his Lasix.  Since he has soft blood pressures, we will discontinue his amlodipine to allow room his blood pressure for this medication change. However, the patient tells me that he has been experiencing chest pain approximately once a week at rest, lasting for five minutes and resolving spontaneously.  He did use one sublingual nitroglycerin yesterday.  He also reports shortness of breath at rest which has been progressive. He denies any dyspnea on exertion. We discussed coronary angiogram and he prefers conservative management at this time. Since he is having anginal symptoms, he is agreeable to starting Ranexa which will not affect his blood pressure. He denies any symptoms of sleep apnea other than drowsiness.  He reports the drowsiness is worse with his current C. Difficile infection.  He otherwise denies any snoring and is not obese. Patient reports no chest pain, shortness of breath, PND, orthopnea, presyncope, syncope, edema, heart racing, or palpitations.    In regards to the watchman procedure, the patient will require a EDNA prior. He tells me that he saw GI in March 2019 and they did an esophageal dilatation and Botox, at that time.  He denies any current difficulty swallowing.  I have asked him and his wife to call the GI provider in [place], to ask if the patient would be able to have a EDNA.  He also currently has C. Difficile after  a course of antibiotics for leg infection.  He prefers to wait until after this has resolved, to have his EDNA. When he follows up with Dr. Franco in June, this can be reassessed and EDNA ordered if able.         Current Cardiac Medications   Amiodarone 100 mg daily  Amlodipine 2.5 mg daily  Aspirin 81 mg daily  Atorvastatin 40 mg daily  Plavix 75 mg daily  Lasix 40 mg daily  Nitroglycerin p.r.n.                     Assessment and Plan:     Plan  1.  Discontinue amlodipine to make room in BP for diuretic therapy  2.  Start Ranexa 500 mg BID   3.  Increase Lasix to 60 mg daily for heart failure  4.  BMP with BNP in one week- we will call with results   5.  Pt to call GI provider to see if they are agreeable to patient having a EDNA (prior to watchman) or if he needs GI f/u first   6.  Follow-up with Dr. Franco 6/10/19, as scheduled       1. CAD with stable angina     1.  Multiple stents 2000    2.  CABG ×3 2004 (LIMA to LAD, SVG to ramus, SVG to diagonal and RPDA)    3.  Angiogram 2013 showing patent grafts    Chest pain weekly at rest      Continue statin, aspirin, and start ranexa     Not on beta blocker d/t prior bradycardia and now soft BPs     If angina may need to increase ranexa      2. PVCs    S/p PVC ablation 2013 bringing burden down to 5%    Continue amiodarone      3. Paroxysmal Atrial Fibrillation    asymptomatic    Elevated [id number] score 6 for age, heart failure, CAD    Rate controlled without  AV lily blocker, continue ASA, plavix no anticoagulation d/t high falls risk     On low-dose amiodarone for history of high PVC burden    Plan for watchman- needs EDNA prior       4. Aortic stenosis     mild in 2016    Stable on echo 2019- mean gradient 11 mmHg and V-max 2.2 m/s    Follow with echocardiogram      5. Chronic diastolic heart failure    Normal LVEF    Chronic bilateral lower extremity edema and SOB at rest     NYHA class II    Continue Lasix    Unable to start beta blocker or ACE inhibitor due to  hypotension and renal insufficiency    Check daily weights and call the clinic if your weight has increased more than 2 lbs in one day or 5 lbs in one week.      6.  Hyperlipidemia    Last LDL 64 on 3/2019    continue atorvastatin 40 mg daily        Greater than 50% of visit spent in face-to-face counseling, 40 mins.       Thank you for allowing me to participate in this delightful patient's care.      This note was completed in part using Dragon voice recognition software. Although reviewed after completion, some word and grammatical errors may occur.    Brooklyn Stewart, APRN, CNP           Data:   All laboratory data reviewed          HPI and Plan:   See dictation    Orders Placed This Encounter   Procedures     Basic metabolic panel     N terminal pro BNP outpatient       Orders Placed This Encounter   Medications     furosemide (LASIX) 40 MG tablet     Sig: Take 1.5 tablets (60 mg) by mouth daily     Dispense:  135 tablet     Refill:  3     ranolazine (RANEXA) 500 MG 12 hr tablet     Sig: Take 1 tablet (500 mg) by mouth 2 times daily     Dispense:  60 tablet     Refill:  11       Medications Discontinued During This Encounter   Medication Reason     amLODIPine (NORVASC) 2.5 MG tablet Therapy completed     furosemide (LASIX) 40 MG tablet          Encounter Diagnoses   Name Primary?     Peripheral edema      Coronary artery disease of bypass graft of native heart with stable angina pectoris (H) Yes     Chronic diastolic heart failure (H)      Hyperlipidemia LDL goal <70      Aortic valve stenosis, etiology of cardiac valve disease unspecified      Paroxysmal atrial fibrillation (H)        CURRENT MEDICATIONS:  Current Outpatient Medications   Medication Sig Dispense Refill     Acetaminophen (TYLENOL 8 HOUR ARTHRITIS PAIN PO) Take 650 mg by mouth every 8 hours as needed       amiodarone (PACERONE/CODARONE) 200 MG tablet Take 0.5 tablets (100 mg) by mouth daily 45 tablet 3     aspirin (ASA) 81 MG EC  tablet Take 81 mg by mouth daily       atorvastatin (LIPITOR) 40 MG tablet Take 1 tablet (40 mg) by mouth every evening 90 tablet 3     calcitonin, salmon, (MIACALCIN) 200 UNIT/ACT nasal spray Spray 1 spray into one nostril alternating nostrils daily 1 Bottle 11     calcium carbonate-vitamin D (CALCIUM 500/D) 500-200 MG-UNIT tablet Take 2 tablets by mouth daily       citalopram (CELEXA) 40 MG tablet Take 1 tablet (40 mg) by mouth daily 90 tablet 3     clopidogrel (PLAVIX) 75 MG tablet Take 1 tablet (75 mg) by mouth daily 90 tablet 3     docusate sodium (COLACE) 100 MG capsule Take 200 mg by mouth daily as needed for constipation       furosemide (LASIX) 40 MG tablet Take 1.5 tablets (60 mg) by mouth daily 135 tablet 3     gabapentin (NEURONTIN) 400 MG capsule Take 1 capsule (400 mg) by mouth 2 times daily 180 capsule 3     Lactobacillus (FLORAJEN ACIDOPHILUS PO)        nitroGLYcerin (NITROSTAT) 0.4 MG sublingual tablet Place 1 tablet (0.4 mg) under the tongue every 5 minutes as needed 25 tablet 6     oxyCODONE (ROXICODONE) 5 MG tablet Take 1-2 tablets (5-10 mg) by mouth every 4 hours as needed Fill on 7-1-19. 90 tablet 0     Probiotic Product (FLORAJEN BIFIDOBLEND) CAPS Taking once daily due to the C-diff.       ranitidine (ZANTAC) 150 MG tablet Take 1 tablet (150 mg) by mouth daily 30 tablet 11     ranolazine (RANEXA) 500 MG 12 hr tablet Take 1 tablet (500 mg) by mouth 2 times daily 60 tablet 11     vancomycin (VANCOCIN) 125 MG capsule Take 1 capsule (125 mg) by mouth 4 times daily for 14 days 56 capsule 0       ALLERGIES     Allergies   Allergen Reactions     Salicylates Unknown and Other (See Comments)     Pt had plain aspirin and reacted by developing many ulcers down esophagus. By Upper GI test they found the ulcers.  Pt is ok to take low grade ecotrin without reaction.        Hydroxychloroquine Rash     Omeprazole Rash     Pt had plain aspirin and reacted by developing many ulcers down esophagus. By Upper GI  test they found the ulcers. Pt is ok to take low grade ecotrin without reaction.       PAST MEDICAL HISTORY:  No past medical history on file.    PAST SURGICAL HISTORY:  No past surgical history on file.    FAMILY HISTORY:  No family history on file.    SOCIAL HISTORY:  Social History     Socioeconomic History     Marital status:      Spouse name: None     Number of children: None     Years of education: None     Highest education level: None   Occupational History     None   Social Needs     Financial resource strain: None     Food insecurity:     Worry: None     Inability: None     Transportation needs:     Medical: None     Non-medical: None   Tobacco Use     Smoking status: Former Smoker     Last attempt to quit: 1952     Years since quittin.4     Smokeless tobacco: Former User   Substance and Sexual Activity     Alcohol use: No     Frequency: Never     Drug use: No     Sexual activity: None   Lifestyle     Physical activity:     Days per week: None     Minutes per session: None     Stress: None   Relationships     Social connections:     Talks on phone: None     Gets together: None     Attends Anab[employer]ist service: None     Active member of club or organization: None     Attends meetings of clubs or organizations: None     Relationship status: None     Intimate partner violence:     Fear of current or ex partner: None     Emotionally abused: None     Physically abused: None     Forced sexual activity: None   Other Topics Concern     None   Social History Narrative     None       Review of Systems:  Skin:  Positive for bruising     Eyes:  Positive for glasses    ENT:  Negative      Respiratory:  Positive for dyspnea on exertion     Cardiovascular:    Positive for;chest pain;palpitations;lower extremity symptoms;edema;fatigue;lightheadedness;dizziness    Gastroenterology: Positive for excessive gas or bloating    Genitourinary:  Negative      Musculoskeletal:  Positive for arthritis;joint pain;joint  swelling;joint stiffness    Neurologic:  Positive for numbness or tingling of hands;numbness or tingling of feet;memory problems;stroke    Psychiatric:  Positive for anxiety;depression    Heme/Lymph/Imm:  Negative      Endocrine:  Negative        Physical Exam:  Vitals: BP 94/54   Pulse 82   Wt 93.4 kg (206 lb)   BMI 27.18 kg/m      Constitutional:  cooperative;in no acute distress        Skin:  warm and dry to the touch          Head:  normocephalic        Eyes:  sclera white        Lymph:      ENT:  no pallor or cyanosis        Neck:  no stiffness        Respiratory:  clear to auscultation;normal symmetry diminished breath sounds bilaterally       Cardiac: regular rhythm;normal S1 and S2                pulses full and equal                                        GI:  abdomen soft        Extremities and Muscular Skeletal:      bilateral LE edema;1+;pitting          Neurological:  affect appropriate        Psych:  Alert and Oriented x 3        CC  No referring provider defined for this encounter.

## 2019-05-15 NOTE — LETTER
5/15/2019    Marky Cox MD  [address] [facility] [address]    RE: Harlan Allen       Dear Colleague,    I had the pleasure of seeing Harlan Allen in the [facility].    Cardiology Clinic Progress Note  Harlan Allen MRN# [id number]   YOB: 1935 Age: 83 year old     Reason For Visit: Heart failure f/u   Primary Cardiologist:   Dr. Franco           History of Presenting Illness:    Harlan Allen is a pleasant 83 year old patient with a past cardiac history significant for CAD with CABG,  PVCs, diastolic heart failure, aortic stenosis, and paroxysmal atrial fibrillation.  Past medical history significant for PE, 3 strokes with most recent being 2018 (now on chronic Plavix), and esophageal strictures requiring dilations.     He has a history of multiple stents in the early 2000's and eventually underwent CABG ×3 in 2004 at . History of symptomatic high burden of PVCs in 2013  S/P PVC ablation with improvement of burden down to 5% and maintained on amiodarone. In regards to his history of stroke, his prior heart monitors did not show any atrial fibrillation.  However, in January 2019 he was found to have new onset atrial fibrillation on EKG and was started on anticoagulation.    Patient saw Dr. Franco on 3/4/2019 in consultation to establish care. He was doing well from a cardiac standpoint.  His primary issue was gait instability with history of falls.  He had chronic lower extremity edema. EP consultation to discuss watchman was recommended along with echocardiogram to reassess aortic stenosis.    Pt was last seen by Dr. Jurado on 4/16/2019.  He was in agreement that the patient would be a good candidate for watchman device given his high risk for falls and bleeds.  It was noted that he would need an EGD prior to preprocedure EDNA, due to his history of esophageal strictures.     Pt presents today for heart failure follow-up. PFTs for  amiodarone testing were normal but they're unable to complete lung volumes. Echocardiogram 3/13/2019 showing LVEF 60%, RV normal size and function, moderate TR, RVSP 32 mmHg, mild aortic stenosis with mean gradient 11 mmHg and V-max 2.2 m/s, dilated IVC, compared to prior echocardiogram from 2016 there was mild aortic stenosis with mean gradient of 15 mmHg and V-max 2.6 m/s. BNP was elevated at 3800.  BMP showing normal electrolytes with renal insufficiency creatinine 1.58 BUN 33 GFR 40. Baseline creatinine since January 2019 looks to be between 1.4 and 1.6. These results were reviewed with him today.    His weight in March and February was 210 pounds and today at our visit is 206 pounds. Given his elevated N-terminal proBNP and dilated IVC on echocardiogram, he is agreeable to increasing his Lasix.  Since he has soft blood pressures, we will discontinue his amlodipine to allow room his blood pressure for this medication change. However, the patient tells me that he has been experiencing chest pain approximately once a week at rest, lasting for five minutes and resolving spontaneously.  He did use one sublingual nitroglycerin yesterday.  He also reports shortness of breath at rest which has been progressive. He denies any dyspnea on exertion.  Since he is having anginal symptoms, he is agreeable to starting Ranexa which will not affect his blood pressure. He denies any symptoms of sleep apnea other than drowsiness.  He reports the drowsiness is worse with his current C. Difficile infection.  He otherwise denies any snoring and is not obese. Patient reports no chest pain, shortness of breath, PND, orthopnea, presyncope, syncope, edema, heart racing, or palpitations.    In regards to the watchman procedure, the patient will require a EDNA prior. He tells me that he saw GI in March 2019 and they did an esophageal dilatation and Botox, at that time.  He denies any current difficulty swallowing.  I have asked him and his  wife to call the GI provider in [place], to ask if the patient would be able to have a EDNA.  He also currently has C. Difficile after a course of antibiotics for leg infection.  He prefers to wait until after this has resolved, to have his EDNA. When he follows up with Dr. Franco in June, this can be reassessed and EDNA ordered if able.         Current Cardiac Medications   Amiodarone 100 mg daily  Amlodipine 2.5 mg daily  Aspirin 81 mg daily  Atorvastatin 40 mg daily  Plavix 75 mg daily  Lasix 40 mg daily  Nitroglycerin p.r.n.                     Assessment and Plan:     Plan  1.  Discontinue amlodipine to make room in BP for diuretic therapy  2.  Start Ranexa 500 mg BID   3.  Increase Lasix to 60 mg daily for heart failure  4.  BMP with BNP in one week- we will call with results   5.  Pt to call GI provider to see if they are agreeable to patient having a EDNA (prior to watchman) or if he needs GI f/u first   6.  Follow-up with Dr. Franco 6/10/19, as scheduled       1. CAD with stable angina     1.  Multiple stents 2000    2.  CABG ×3 2004 (LIMA to LAD, SVG to ramus, SVG to diagonal and RPDA)    3.  Angiogram 2013 showing patent grafts    Chest pain weekly at rest      Continue statin, aspirin, and start ranexa     Not on beta blocker d/t prior bradycardia and now soft BPs     If angina may need to increase ranexa      2. PVCs    S/p PVC ablation 2013 bringing burden down to 5%    Continue amiodarone      3. Paroxysmal Atrial Fibrillation    asymptomatic    Elevated [id number] score 6 for age, heart failure, CAD    Rate controlled without  AV lily blocker, continue ASA, plavix no anticoagulation d/t high falls risk     On low-dose amiodarone for history of high PVC burden    Plan for watchman- needs EDNA prior       4. Aortic stenosis     mild in 2016    Stable on echo 2019- mean gradient 11 mmHg and V-max 2.2 m/s    Follow with echocardiogram      5. Chronic diastolic heart failure    Normal LVEF    Chronic  bilateral lower extremity edema and SOB at rest     NYHA class II    Continue Lasix    Unable to start beta blocker or ACE inhibitor due to hypotension and renal insufficiency    Check daily weights and call the clinic if your weight has increased more than 2 lbs in one day or 5 lbs in one week.      6.  Hyperlipidemia    Last LDL 64 on 3/2019    continue atorvastatin 40 mg daily        Greater than 50% of visit spent in face-to-face counseling, 40 mins.       Thank you for allowing me to participate in this delightful patient's care.      This note was completed in part using Dragon voice recognition software. Although reviewed after completion, some word and grammatical errors may occur.    Brooklyn Stewart, APRN, CNP           Data:   All laboratory data reviewed          HPI and Plan:   See dictation    Orders Placed This Encounter   Procedures     Basic metabolic panel     N terminal pro BNP outpatient       Orders Placed This Encounter   Medications     furosemide (LASIX) 40 MG tablet     Sig: Take 1.5 tablets (60 mg) by mouth daily     Dispense:  135 tablet     Refill:  3     ranolazine (RANEXA) 500 MG 12 hr tablet     Sig: Take 1 tablet (500 mg) by mouth 2 times daily     Dispense:  60 tablet     Refill:  11       Medications Discontinued During This Encounter   Medication Reason     amLODIPine (NORVASC) 2.5 MG tablet Therapy completed     furosemide (LASIX) 40 MG tablet          Encounter Diagnoses   Name Primary?     Peripheral edema      Coronary artery disease of bypass graft of native heart with stable angina pectoris (H) Yes     Chronic diastolic heart failure (H)      Hyperlipidemia LDL goal <70      Aortic valve stenosis, etiology of cardiac valve disease unspecified      Paroxysmal atrial fibrillation (H)        CURRENT MEDICATIONS:  Current Outpatient Medications   Medication Sig Dispense Refill     Acetaminophen (TYLENOL 8 HOUR ARTHRITIS PAIN PO) Take 650 mg by mouth every 8 hours as  needed       amiodarone (PACERONE/CODARONE) 200 MG tablet Take 0.5 tablets (100 mg) by mouth daily 45 tablet 3     aspirin (ASA) 81 MG EC tablet Take 81 mg by mouth daily       atorvastatin (LIPITOR) 40 MG tablet Take 1 tablet (40 mg) by mouth every evening 90 tablet 3     calcitonin, salmon, (MIACALCIN) 200 UNIT/ACT nasal spray Spray 1 spray into one nostril alternating nostrils daily 1 Bottle 11     calcium carbonate-vitamin D (CALCIUM 500/D) 500-200 MG-UNIT tablet Take 2 tablets by mouth daily       citalopram (CELEXA) 40 MG tablet Take 1 tablet (40 mg) by mouth daily 90 tablet 3     clopidogrel (PLAVIX) 75 MG tablet Take 1 tablet (75 mg) by mouth daily 90 tablet 3     docusate sodium (COLACE) 100 MG capsule Take 200 mg by mouth daily as needed for constipation       furosemide (LASIX) 40 MG tablet Take 1.5 tablets (60 mg) by mouth daily 135 tablet 3     gabapentin (NEURONTIN) 400 MG capsule Take 1 capsule (400 mg) by mouth 2 times daily 180 capsule 3     Lactobacillus (FLORAJEN ACIDOPHILUS PO)        nitroGLYcerin (NITROSTAT) 0.4 MG sublingual tablet Place 1 tablet (0.4 mg) under the tongue every 5 minutes as needed 25 tablet 6     oxyCODONE (ROXICODONE) 5 MG tablet Take 1-2 tablets (5-10 mg) by mouth every 4 hours as needed Fill on 7-1-19. 90 tablet 0     Probiotic Product (FLORAJEN BIFIDOBLEND) CAPS Taking once daily due to the C-diff.       ranitidine (ZANTAC) 150 MG tablet Take 1 tablet (150 mg) by mouth daily 30 tablet 11     ranolazine (RANEXA) 500 MG 12 hr tablet Take 1 tablet (500 mg) by mouth 2 times daily 60 tablet 11     vancomycin (VANCOCIN) 125 MG capsule Take 1 capsule (125 mg) by mouth 4 times daily for 14 days 56 capsule 0       ALLERGIES     Allergies   Allergen Reactions     Salicylates Unknown and Other (See Comments)     Pt had plain aspirin and reacted by developing many ulcers down esophagus. By Upper GI test they found the ulcers.  Pt is ok to take low grade ecotrin without reaction.         Hydroxychloroquine Rash     Omeprazole Rash     Pt had plain aspirin and reacted by developing many ulcers down esophagus. By Upper GI test they found the ulcers. Pt is ok to take low grade ecotrin without reaction.       PAST MEDICAL HISTORY:  No past medical history on file.    PAST SURGICAL HISTORY:  No past surgical history on file.    FAMILY HISTORY:  No family history on file.    SOCIAL HISTORY:  Social History     Socioeconomic History     Marital status:      Spouse name: None     Number of children: None     Years of education: None     Highest education level: None   Occupational History     None   Social Needs     Financial resource strain: None     Food insecurity:     Worry: None     Inability: None     Transportation needs:     Medical: None     Non-medical: None   Tobacco Use     Smoking status: Former Smoker     Last attempt to quit: 1952     Years since quittin.4     Smokeless tobacco: Former User   Substance and Sexual Activity     Alcohol use: No     Frequency: Never     Drug use: No     Sexual activity: None   Lifestyle     Physical activity:     Days per week: None     Minutes per session: None     Stress: None   Relationships     Social connections:     Talks on phone: None     Gets together: None     Attends Christian service: None     Active member of club or organization: None     Attends meetings of clubs or organizations: None     Relationship status: None     Intimate partner violence:     Fear of current or ex partner: None     Emotionally abused: None     Physically abused: None     Forced sexual activity: None   Other Topics Concern     None   Social History Narrative     None       Review of Systems:  Skin:  Positive for bruising     Eyes:  Positive for glasses    ENT:  Negative      Respiratory:  Positive for dyspnea on exertion     Cardiovascular:    Positive for;chest pain;palpitations;lower extremity symptoms;edema;fatigue;lightheadedness;dizziness     Gastroenterology: Positive for excessive gas or bloating    Genitourinary:  Negative      Musculoskeletal:  Positive for arthritis;joint pain;joint swelling;joint stiffness    Neurologic:  Positive for numbness or tingling of hands;numbness or tingling of feet;memory problems;stroke    Psychiatric:  Positive for anxiety;depression    Heme/Lymph/Imm:  Negative      Endocrine:  Negative        Physical Exam:  Vitals: BP 94/54   Pulse 82   Wt 93.4 kg (206 lb)   BMI 27.18 kg/m       Constitutional:  cooperative;in no acute distress        Skin:  warm and dry to the touch          Head:  normocephalic        Eyes:  sclera white        Lymph:      ENT:  no pallor or cyanosis        Neck:  no stiffness        Respiratory:  clear to auscultation;normal symmetry diminished breath sounds bilaterally       Cardiac: regular rhythm;normal S1 and S2                pulses full and equal                                        GI:  abdomen soft        Extremities and Muscular Skeletal:      bilateral LE edema;1+;pitting          Neurological:  affect appropriate        Psych:  Alert and Oriented x 3          Thank you for allowing me to participate in the care of your patient.    Sincerely,     CHAU Castanon [facility]

## 2019-05-15 NOTE — PROGRESS NOTES
"  SUBJECTIVE:   Harlan Allen is a 83 year old male who presents to clinic today for the following   health issues:      ED/UC Followup:    Facility:  [facility]  Date of visit: 5-2-19  Reason for visit: ER NOTE IS COPIED BELOW:  Diarrhea        for a month      HPI  Harlan Allen is a 83 year old male with diarrhea diarrhea x ~ 3 weeks. Occasional blood with diarrhea, but not recently. No abd pain, fever or chills.  Using Imodium with little relief.  He has mild generalized weakness, increased thirst and dry mouth with decreased urine output.  He and his wife are concerned about dehydration.  He was recently on an unknown antibiotic possible left second \"toe ulcer\" (with hammer toe) infection ~ 1 month ago, preceding onset of diarrhea.  No improvement on antibiotics and will be following up with Podiatrist. Previous records show Augmentin rx 4/4/19.  No suspicious bad food ingestion, recent travel or known infectious exposures.  No history of inflammatory bowel disease.  No other acute complaints or concerns.     Current Status: C-diff stool testing was positive.  He is still having loose stools.  He is having the bowel movements at night.  The stool was becoming more formed but now loose again.  They would like to discuss about getting a refill of the medication.  He is taking Florajen also, once daily with his medication.  His wife has been pushing liquids and food for him.  He did have a fever of 103 at home yesterday.        NEUROPATHY:  His wife states he is having trouble with walking.  This has been more recent and getting worse.  He will go to get up and fall sideways.  He uses to walk with a cane and now is having to use a walker. Taking Gabapentin for peripheral polyneuropathy.  His wife is concerned about this.      VASCULAR APPOINTMENT:  Patient had an appointment yesterday with the vascular clinic.  They state he will be needing to have an angiogram done.  This is not scheduled yet.  Wanting to know " if the vascular office will be calling about a date.        Current Outpatient Medications:      Acetaminophen (TYLENOL 8 HOUR ARTHRITIS PAIN PO), Take 650 mg by mouth every 8 hours as needed, Disp: , Rfl:      amiodarone (PACERONE/CODARONE) 200 MG tablet, Take 0.5 tablets (100 mg) by mouth daily, Disp: 45 tablet, Rfl: 3     amLODIPine (NORVASC) 2.5 MG tablet, Take 1 tablet (2.5 mg) by mouth daily, Disp: 90 tablet, Rfl: 3     aspirin (ASA) 81 MG EC tablet, Take 81 mg by mouth daily, Disp: , Rfl:      atorvastatin (LIPITOR) 40 MG tablet, Take 1 tablet (40 mg) by mouth every evening, Disp: 90 tablet, Rfl: 3     calcitonin, salmon, (MIACALCIN) 200 UNIT/ACT nasal spray, Spray 1 spray into one nostril alternating nostrils daily, Disp: 1 Bottle, Rfl: 11     calcium carbonate-vitamin D (CALCIUM 500/D) 500-200 MG-UNIT tablet, Take 2 tablets by mouth daily, Disp: , Rfl:      citalopram (CELEXA) 40 MG tablet, Take 1 tablet (40 mg) by mouth daily, Disp: 90 tablet, Rfl: 3     clopidogrel (PLAVIX) 75 MG tablet, Take 1 tablet (75 mg) by mouth daily, Disp: 90 tablet, Rfl: 3     furosemide (LASIX) 40 MG tablet, Take 1 tablet (40 mg) by mouth daily, Disp: 90 tablet, Rfl: 3     gabapentin (NEURONTIN) 400 MG capsule, Take 1 capsule (400 mg) by mouth 2 times daily, Disp: 180 capsule, Rfl: 3     Lactobacillus (FLORAJEN ACIDOPHILUS PO), , Disp: , Rfl:      nitroGLYcerin (NITROSTAT) 0.4 MG sublingual tablet, Place 1 tablet (0.4 mg) under the tongue every 5 minutes as needed, Disp: 25 tablet, Rfl: 6     oxyCODONE (ROXICODONE) 5 MG tablet, Take 1-2 tablets (5-10 mg) by mouth every 4 hours as needed Fill on 7-1-19., Disp: 90 tablet, Rfl: 0     Probiotic Product (FLORAJEN BIFIDOBLEND) CAPS, Taking once daily due to the C-diff., Disp: , Rfl:      ranitidine (ZANTAC) 150 MG tablet, Take 1 tablet (150 mg) by mouth daily, Disp: 30 tablet, Rfl: 11     vancomycin (VANCOCIN) 125 MG capsule, Take 1 capsule (125 mg) by mouth 4 times daily for 14 days,  "Disp: 56 capsule, Rfl: 0     docusate sodium (COLACE) 100 MG capsule, Take 200 mg by mouth daily as needed for constipation, Disp: , Rfl:     Patient Active Problem List   Diagnosis     Essential hypertension     Hyperlipidemia LDL goal <100     Moderate major depression (H)     ASHD (arteriosclerotic heart disease)     Atrial fibrillation (H)     Ischemic toe ulcer, left, with fat layer exposed (H)     Blood pressure 96/54, pulse 66, temperature 98.6  F (37  C), temperature source Tympanic, resp. rate 20, height 1.854 m (6' 1\"), weight 93 kg (205 lb), SpO2 94 %.    Exam:  GENERAL APPEARANCE: healthy, alert and no distress  ABDOMEN:  soft, nontender, no HSM or masses and bowel sounds normal  SKIN: no suspicious lesions or rashes, except for the left foot ulcer.       (A04.72) Colitis due to Clostridium difficile  (primary encounter diagnosis)  Comment:   Plan: finish the Vancomyin 4 times daily for the next 3 days and the stop it. Monitor for the resolution of the diarrhea,   And all the other symptoms. This could take several weeks to happen but should be gradually improving.   If the diarrhea recurs then call our clinic RN at [phone] and I will reorder the antibiotic. You may use the Probiotics such as yogurt.     (I73.9) Peripheral vascular disease (H)  Comment:   Plan: follow the Cardiology instructions. It would be best to do further testing and treatment after the bowels are close to normal.     (I95.1) Orthostatic hypotension  Comment:   Plan:  Notify Cardiology about the lower blood pressures. They should give instruction on the range of acceptable readings. The meds may need adjusting.   Get a home BP machine and calibrate it annually. Stay well hydrated and monitor the urine output. Diarrhea can worsen this. Get up slowly from laying or sitting and then stand  Next to the bed or chair for 15 seconds.       Marky Cox"

## 2019-05-15 NOTE — PATIENT INSTRUCTIONS
Thank you for choosing [facility].  You may be receiving an email and/or telephone survey request from [facility] [employer] regarding your visit today.  Please take a few minutes to respond to the survey to let us know how we are doing.      If you have questions or concerns, please contact us via Rainbow or you can contact your care team at [phone].    Our Clinic hours are:  Monday 6:40 am  to 7:00 pm  Tuesday -Friday 6:40 am to 5:00 pm    The [place] outpatient lab hours are:  Monday - Friday 6:10 am to 4:45 pm  Saturdays 7:00 am to 11:00 am  Appointments are required, call [phone]    If you have clinical questions after hours or would like to schedule an appointment,  call the clinic at [phone].    (A04.72) Colitis due to Clostridium difficile  (primary encounter diagnosis)  Comment:   Plan: finish the Vancomyin 4 times daily for the next 3 days and the stop it. Monitor for the resolution of the diarrhea,   And all the other symptoms. This could take several weeks to happen but should be gradually improving.   If the diarrhea recurs then call our clinic RN at [phone] and I will reorder the antibiotic. You may use the Probiotics such as yogurt.     (I73.9) Peripheral vascular disease (H)  Comment:   Plan: follow the Cardiology instructions. It would be best to do further testing and treatment after the bowels are close to normal.     (I95.1) Orthostatic hypotension  Comment:   Plan:  Notify Cardiology about the lower blood pressures. They should give instruction on the range of acceptable readings. The meds may need adjusting.   Get a home BP machine and calibrate it annually. Stay well hydrated and monitor the urine output. Diarrhea can worsen this. Get up slowly from laying or sitting and then stand  Next to the bed or chair for 15 seconds.

## 2019-05-16 NOTE — TELEPHONE ENCOUNTER
"I called to discuss scheduling  LEFT LEG ANGIOGRAM, POSSIBLE INTERVENTION, CO2 AVAILABLE. Per his wife Yaquelin, he has C Diff and his primary said don't do the angiogram before 5 weeks.  He is also seeing cardiology, who want to do a EDNA and \"fix the valve in his heart.\"      Routing to Dr Palomo's RN to check with cardiology and give us the order of when the angiogram should be done.  His wife Yaquelin would like a call to know the order as well - [phone], can leave [facility]..  I will put the surgery form in Dr Palomo's yellow folder. Routing to Dr Palomo's RN. Maria De Jesus Roberson -  at [facility]    "

## 2019-06-10 PROBLEM — N18.30 CKD (CHRONIC KIDNEY DISEASE) STAGE 3, GFR 30-59 ML/MIN (H): Status: ACTIVE | Noted: 2019-01-01

## 2019-06-10 NOTE — LETTER
6/10/2019        RE: Harlan Allen  [address]  [place] [address]        [facility]  PRIMARY CARE PROVIDER AND CLINIC:  Marky Cox MD, [address] [facility] / [facility] [address]  Chief Complaint   Patient presents with     Hospital F/U     [facility] Record Number:  [id number]  Place of [facility] where encounter took place[facility] [[id number]]    Harlan Allen  is a 84 year old  (1935), admitted to the above facility from  [facility] . Hospital stay 5/27/19 through 6/7/19..  Admitted to this facility for  rehab, medical management and nursing care.    HPI:    HPI information obtained from: facility chart records, facility staff, patient report and [facility] chart review.   Brief Summary of Hospital Course: Harlan Allen has a past medical history of CAD, CVA, CHFpEF, PVD, spinal stenosis, mild aortic stenosis, anemia, and esophageal stricture.  He has been working with vascular surgeon (Dr. Palomo) as well as cardiology at [facility] due to afib on plavix and aspirin (not on warfarin due to high falls risk) as well as significant PVD with open wounds on feet.  His last few weeks/months have been complicated by C-difficile infection as well as falling.  He had fallen out of bed on the morning of admission (and reports of other falls as well) and was more confused. There was concern for another CVA.  S/he was admitted to the hospital and found to have strep bacteremia, chronic osteomyelitis of the L second toe, as well as a R radial head fracture due to fall. He has a complicated past medical history, and his condition is tenuous.    Updates on Status Since Skilled nursing Admission: Harlan reports he has occasional chest pain, chronic neck and low back pain.  Nursing reports concerns that he is not eating, not drinking well.      CODE STATUS/ADVANCE DIRECTIVES DISCUSSION:   DNR/I  Patient's living condition: lives with  spouse  ALLERGIES: Salicylates; Hydroxychloroquine; and Omeprazole  PAST MEDICAL HISTORY:  has a past medical history of Anemia, Cerebral infarction (H), Chronic kidney disease, Congestive heart failure (H), Coronary artery disease, Depressive disorder, Hyperlipidemia, Hypertension, and Peripheral vascular disease (H).  PAST SURGICAL HISTORY:   has a past surgical history that includes appendectomy and Cholecystectomy.  FAMILY HISTORY: family history is not on file.  SOCIAL HISTORY:   reports that he quit smoking about 67 years ago. He has quit using smokeless tobacco. He reports that he does not drink alcohol or use drugs.    Post Discharge Medication Reconciliation Status: discharge medications reconciled and changed, per note/orders (see AVS)    Current Outpatient Medications   Medication Sig Dispense Refill     Acetaminophen (TYLENOL 8 HOUR ARTHRITIS PAIN PO) Take 650 mg by mouth every 8 hours as needed       amiodarone (PACERONE/CODARONE) 200 MG tablet Take 0.5 tablets (100 mg) by mouth daily 45 tablet 3     aspirin (ASA) 81 MG EC tablet Take 81 mg by mouth daily       atorvastatin (LIPITOR) 40 MG tablet Take 1 tablet (40 mg) by mouth every evening 90 tablet 3     calcitonin, salmon, (MIACALCIN) 200 UNIT/ACT nasal spray Spray 1 spray into one nostril alternating nostrils daily 1 Bottle 11     calcium carbonate-vitamin D (CALCIUM 500/D) 500-200 MG-UNIT tablet Take 2 tablets by mouth daily       citalopram (CELEXA) 40 MG tablet Take 1 tablet (40 mg) by mouth daily 90 tablet 3     docusate sodium (COLACE) 100 MG capsule Take 200 mg by mouth daily as needed for constipation       furosemide (LASIX) 40 MG tablet Take 1 tablet (40 mg) by mouth daily 90 tablet 1     gabapentin (NEURONTIN) 400 MG capsule Take 1 capsule (400 mg) by mouth 2 times daily 180 capsule 3     nitroGLYcerin (NITROSTAT) 0.4 MG sublingual tablet Place 1 tablet (0.4 mg) under the tongue every 5 minutes as needed 25 tablet 6     ranitidine (ZANTAC)  150 MG tablet Take 1 tablet (150 mg) by mouth daily 30 tablet 11     ranolazine (RANEXA) 500 MG 12 hr tablet Take 1 tablet (500 mg) by mouth 2 times daily 60 tablet 11     ROS:  Limited secondary to cognitive impairment but today pt reports pain in back/neck, occasional chest pain, no other concerns.     Vitals:  /67   Pulse 108   Temp 97.5  F (36.4  C)   Resp 18   Wt 88 kg (194 lb)   SpO2 94%   BMI 25.60 kg/m     Exam:  GENERAL APPEARANCE:  Alert, in no distress   HEAD:  Normal, normocephalic, atraumatic  EYE EXAM: normal external eye, conjunctiva, lids, CARA  NECK EXAM: supple, no JVD  CHEST/RESP:  respiratory effort normal, lung sounds CTA , no respiratory distress  CV:  Rate reg, rhythm irregular, no murmur, no peripheral edema   NEUROLOGIC EXAM: Normal gross motor movement, tone and coordination. No tremor. Cranial nerves 2-12 are normal tested and grossly at patient's baseline  PSYCH:  Alert and oriented to self, affect pleasant     Lab/Diagnostic data:      ASSESSMENT/PLAN:  Falls frequently  Closed displaced fracture of head of right radius with routine healing, subsequent encounter  Falling, with subsequent R radial head fracture, with conservative treatment planned.  He has minimal pain, limited weight bearing on the R UE and should be using a platform walker for ambulation.    -Planned follow up with  Geriatric Ortho clinic-date to be determined  -weight bearing restrictions    Cognitive impairment  Patient with some underlying cognitive impairment, per review of hospital records though no current diagnosis of dementia.    -OT cognitive testing to help determine baseline status    Persistent atrial fibrillation (H)  Long term current use of anticoagulant therapy  Patient with known history of afib but previously not on warfarin due to falls risk.  [id number] score 6.  He had been trying to complete follow up with cardiology at [facility] (Dr. Jurado), to get Watchman device planted, which had  been recommended.  However, he had been battling PVD, toe wound and needed EGD/clearance from GI prior to [facility]DNA and then planned to make final decision regarding ability to have watchman device.  He had been on plavix and aspirin. He initially presented to [facility] with some concerns for sided-weakness, so consult with neurology and cardiology there led to discontinuation of plavix and start of warfarin.  INR has been subtherapeutic during hospital stay, today is 3.1 with goal of 2-3.   -continue warfarin and aspirin, also on amiodarone  -follow up with cardiology as outpatient to determine next steps    CKD (chronic kidney disease) stage 3, GFR 30-59 ml/min (H)  Patient with known kidney disease, baseline creat about 1.2-1.5.  Did have mild elevation of creat during hospital stay but has normalized upon discharge.   -Avoid nephrotoxic medications   -Renal dosing of medications  -monitor kidney function routinely      Chronic diastolic heart failure (H)  Patient with last echo 3/2019 indicating LVEF 60%. On lasix, statin, followed by cardiology    Peripheral vascular disease (H)  Osteomyelitis of toe of left foot (H)  Patient with known history of PVD, wounds in the past.  On gabapentin. Follows with vascular, Dr. Palomo.  Has plans for balloon angioplasty when cleared by cardiology/wounds are healed/no cdiff infection. CT scan indicated likely L 2nd toe osteomyelitis so now on IV antibiotics x 4 weeks (Ceftriaxone) with follow up per ID, vascular.   -weekly labs for infection follow up   -schedule appointment for ID    Bacteremia due to Streptococcus  Thought due to chronic toe infection.  On IV Ceftriaxone x 4 weeks.     History of Clostridium difficile colitis  Constipation   Patient with history of cdiff infection about 4-5 weeks ago and upon admission concerns about diarrhea.  However, he has been diarrhea free x 3 days, now constipated.  Currently medications must be crushed and colace is not crushable  -change  "colace to miralax  -no need to send stool sample for cdiff  -monitor bowels    CVA history   Patient with known history of CVA x3 over last years.  Baseline mentation not clear to me.  There was also some concern that he had a \"tremor/shaking TIA\" and was started on keppra.  Will likely need follow up with neurology to determine next steps.        transcribed by : Belinda Meyer  1. Discontinue stool sample for c diff- constipated  2. Discontinue colace as it cannot be crushed  3. Miralax 17 gm po every day constipation  4. Dietary consult due to poor po intake  5. Med pass 2.0 - 4 oz TID between meals Dx supplement  6. Discontinue enteric coated aspirin  7. Ok for chewable aspirin 81mg po every day  8. Warfarin 2 mg po every day Dx afib  9. Recheck INR 6/13/19      Electronically signed by:  CHAU Reilly CNP                         Sincerely,        CHAU Reilly CNP    "

## 2019-06-10 NOTE — PROGRESS NOTES
[facility]  PRIMARY CARE PROVIDER AND CLINIC:  Marky Cox MD, [address] [facility] / [facility] [address]  Chief Complaint   Patient presents with     Hospital F/U     [facility] Record Number:  [id number]  Place of Service where encounter took place:  [facility] [[id number]]    Harlan Allen  is a 84 year old  (1935), admitted to the above facility from  [facility] . Hospital stay 5/27/19 through 6/7/19..  Admitted to this facility for  rehab, medical management and nursing care.    HPI:    HPI information obtained from: facility chart records, facility staff, patient report and [facility] chart review.   Brief Summary of Hospital Course: Harlan Allen has a past medical history of CAD, CVA, CHFpEF, PVD, spinal stenosis, mild aortic stenosis, anemia, and esophageal stricture.  He has been working with vascular surgeon (Dr. Palomo) as well as [facility] at [facility] due to afib on plavix and aspirin (not on warfarin due to high falls risk) as well as significant PVD with open wounds on feet.  His last few weeks/months have been complicated by C-difficile infection as well as falling.  He had fallen out of bed on the morning of admission (and reports of other falls as well) and was more confused. There was concern for another CVA.  S/he was admitted to the hospital and found to have strep bacteremia, chronic osteomyelitis of the L second toe, as well as a R radial head fracture due to fall. He has a complicated past medical history, and his condition is tenuous.    Updates on Status Since Skilled nursing Admission: Harlan reports he has occasional chest pain, chronic neck and low back pain.  Nursing reports concerns that he is not eating, not drinking well.      CODE STATUS/ADVANCE DIRECTIVES DISCUSSION:   DNR/I  Patient's living condition: lives with spouse  ALLERGIES: Salicylates; Hydroxychloroquine; and Omeprazole  PAST MEDICAL HISTORY:  has a past  medical history of Anemia, Cerebral infarction (H), Chronic kidney disease, Congestive heart failure (H), Coronary artery disease, Depressive disorder, Hyperlipidemia, Hypertension, and Peripheral vascular disease (H).  PAST SURGICAL HISTORY:   has a past surgical history that includes appendectomy and Cholecystectomy.  FAMILY HISTORY: family history is not on file.  SOCIAL HISTORY:   reports that he quit smoking about 67 years ago. He has quit using smokeless tobacco. He reports that he does not drink alcohol or use drugs.    Post Discharge Medication Reconciliation Status: discharge medications reconciled and changed, per note/orders (see AVS)    Current Outpatient Medications   Medication Sig Dispense Refill     Acetaminophen (TYLENOL 8 HOUR ARTHRITIS PAIN PO) Take 650 mg by mouth every 8 hours as needed       amiodarone (PACERONE/CODARONE) 200 MG tablet Take 0.5 tablets (100 mg) by mouth daily 45 tablet 3     aspirin (ASA) 81 MG EC tablet Take 81 mg by mouth daily       atorvastatin (LIPITOR) 40 MG tablet Take 1 tablet (40 mg) by mouth every evening 90 tablet 3     calcitonin, salmon, (MIACALCIN) 200 UNIT/ACT nasal spray Spray 1 spray into one nostril alternating nostrils daily 1 Bottle 11     calcium carbonate-vitamin D (CALCIUM 500/D) 500-200 MG-UNIT tablet Take 2 tablets by mouth daily       citalopram (CELEXA) 40 MG tablet Take 1 tablet (40 mg) by mouth daily 90 tablet 3     docusate sodium (COLACE) 100 MG capsule Take 200 mg by mouth daily as needed for constipation       furosemide (LASIX) 40 MG tablet Take 1 tablet (40 mg) by mouth daily 90 tablet 1     gabapentin (NEURONTIN) 400 MG capsule Take 1 capsule (400 mg) by mouth 2 times daily 180 capsule 3     nitroGLYcerin (NITROSTAT) 0.4 MG sublingual tablet Place 1 tablet (0.4 mg) under the tongue every 5 minutes as needed 25 tablet 6     ranitidine (ZANTAC) 150 MG tablet Take 1 tablet (150 mg) by mouth daily 30 tablet 11     ranolazine (RANEXA) 500 MG 12 hr  tablet Take 1 tablet (500 mg) by mouth 2 times daily 60 tablet 11     ROS:  Limited secondary to cognitive impairment but today pt reports pain in back/neck, occasional chest pain, no other concerns.     Vitals:  /67   Pulse 108   Temp 97.5  F (36.4  C)   Resp 18   Wt 88 kg (194 lb)   SpO2 94%   BMI 25.60 kg/m    Exam:  GENERAL APPEARANCE:  Alert, in no distress   HEAD:  Normal, normocephalic, atraumatic  EYE EXAM: normal external eye, conjunctiva, lids, CARA  NECK EXAM: supple, no JVD  CHEST/RESP:  respiratory effort normal, lung sounds CTA , no respiratory distress  CV:  Rate reg, rhythm irregular, no murmur, no peripheral edema   NEUROLOGIC EXAM: Normal gross motor movement, tone and coordination. No tremor. Cranial nerves 2-12 are normal tested and grossly at patient's baseline  PSYCH:  Alert and oriented to self, affect pleasant     Lab/Diagnostic data:      ASSESSMENT/PLAN:  Falls frequently  Closed displaced fracture of head of right radius with routine healing, subsequent encounter  Falling, with subsequent R radial head fracture, with conservative treatment planned.  He has minimal pain, limited weight bearing on the R UE and should be using a platform walker for ambulation.    -Planned follow up with  Geriatric Ortho clinic-date to be determined  -weight bearing restrictions    Cognitive impairment  Patient with some underlying cognitive impairment, per review of hospital records though no current diagnosis of dementia.    -OT cognitive testing to help determine baseline status    Persistent atrial fibrillation (H)  Long term current use of anticoagulant therapy  Patient with known history of afib but previously not on warfarin due to falls risk.  [id number] score 6.  He had been trying to complete follow up with cardiology at [facility] (Dr. Jurado), to get Watchman device planted, which had been recommended.  However, he had been battling PVD, toe wound and needed EGD/clearance from GI prior  to [facility]DNA and then planned to make final decision regarding ability to have watchman device.  He had been on plavix and aspirin. He initially presented to [facility] with some concerns for sided-weakness, so consult with neurology and cardiology there led to discontinuation of plavix and start of warfarin.  INR has been subtherapeutic during hospital stay, today is 3.1 with goal of 2-3.   -continue warfarin and aspirin, also on amiodarone  -follow up with cardiology as outpatient to determine next steps    CKD (chronic kidney disease) stage 3, GFR 30-59 ml/min (H)  Patient with known kidney disease, baseline creat about 1.2-1.5.  Did have mild elevation of creat during hospital stay but has normalized upon discharge.   -Avoid nephrotoxic medications   -Renal dosing of medications  -monitor kidney function routinely      Chronic diastolic heart failure (H)  Patient with last echo 3/2019 indicating LVEF 60%. On lasix, statin, followed by cardiology    Peripheral vascular disease (H)  Osteomyelitis of toe of left foot (H)  Patient with known history of PVD, wounds in the past.  On gabapentin. Follows with vascular, Dr. Palomo.  Has plans for balloon angioplasty when cleared by cardiology/wounds are healed/no cdiff infection. CT scan indicated likely L 2nd toe osteomyelitis so now on IV antibiotics x 4 weeks (Ceftriaxone) with follow up per ID, vascular.   -weekly labs for infection follow up   -schedule appointment for ID    Bacteremia due to Streptococcus  Thought due to chronic toe infection.  On IV Ceftriaxone x 4 weeks.     History of Clostridium difficile colitis  Constipation   Patient with history of cdiff infection about 4-5 weeks ago and upon admission concerns about diarrhea.  However, he has been diarrhea free x 3 days, now constipated.  Currently medications must be crushed and colace is not crushable  -change colace to miralax  -no need to send stool sample for cdiff  -monitor bowels    CVA history   Patient with  "known history of CVA x3 over last years.  Baseline mentation not clear to me.  There was also some concern that he had a \"tremor/shaking TIA\" and was started on keppra.  Will likely need follow up with neurology to determine next steps.        transcribed by : Belinda Meyer  1. Discontinue stool sample for c diff- constipated  2. Discontinue colace as it cannot be crushed  3. Miralax 17 gm po every day constipation  4. Dietary consult due to poor po intake  5. Med pass 2.0 - 4 oz TID between meals Dx supplement  6. Discontinue enteric coated aspirin  7. Ok for chewable aspirin 81mg po every day  8. Warfarin 2 mg po every day Dx afib  9. Recheck INR 6/13/19      Electronically signed by:  CHAU Reilly CNP                     "

## 2019-06-12 NOTE — PROGRESS NOTES
[place] [facility]  [place] Medical Record Number:  [id number]  Place of Service where encounter took place:  [facility] [[id number]]  Chief Complaint   Patient presents with     Nursing Home Acute     HPI:    Harlan Allen  is a 84 year old (1935), who is being seen today for an episodic care visit.  HPI information obtained from: facility chart records, facility staff, patient report and [facility] chart review. Today's concern is:     Bacteremia due to Streptococcus  Cognitive impairment  Closed displaced fracture of head of right radius with routine healing, subsequent encounter  Chronic diastolic heart failure (H)  Peripheral vascular disease (H)  Osteomyelitis of toe of left foot (H)  History of Clostridium difficile colitis  History of CVA (cerebrovascular accident)  Essential hypertension  Spinal stenosis of lumbar region, unspecified whether neurogenic claudication present  Other chronic pain     Harlan reports minimal information and states he just wants to go home.  Spouse, Yaquelin, is here today and recounts a history of past hospital stay and goals to bring home. Yaquelin reports he would take oxycodone 5 mg at least TID at home to manage chronic pain.  She also notes that he states he would like to die, wonders if his antidepressant can be changed.  She would like to take him home but he needs to be able to transfer.  Nursing reports no new concerns, patient eating a bit better today. .       Past Medical and Surgical History reviewed in Epic today.    MEDICATIONS:  Current Outpatient Medications   Medication Sig Dispense Refill     Acetaminophen (TYLENOL 8 HOUR ARTHRITIS PAIN PO) Take 650 mg by mouth every 8 hours as needed       amiodarone (PACERONE/CODARONE) 200 MG tablet Take 0.5 tablets (100 mg) by mouth daily 45 tablet 3     amLODIPine (NORVASC) 2.5 MG tablet Take 1 tablet (2.5 mg) by mouth daily       aspirin (ASA) 81 MG chewable tablet Take 1 tablet (81 mg) by  mouth daily       aspirin (ASA) 81 MG EC tablet Take 81 mg by mouth daily       atorvastatin (LIPITOR) 40 MG tablet Take 1 tablet (40 mg) by mouth every evening 90 tablet 3     calcitonin, salmon, (MIACALCIN) 200 UNIT/ACT nasal spray Spray 1 spray into one nostril alternating nostrils daily 1 Bottle 11     calcium carbonate-vitamin D (CALCIUM 500/D) 500-200 MG-UNIT tablet Take 2 tablets by mouth daily       cefTRIAXone (ROCEPHIN) 1 GM vial Inject 2 g (2,000 mg) into the vein every 24 hours 20 mL 0     docusate sodium (COLACE) 100 MG capsule Take 200 mg by mouth daily as needed for constipation       ferrous sulfate (FE TABS) 325 (65 Fe) MG EC tablet Take 1 tablet (325 mg) by mouth daily       furosemide (LASIX) 40 MG tablet Take 1 tablet (40 mg) by mouth daily 90 tablet 1     gabapentin (NEURONTIN) 400 MG capsule Take 1 capsule (400 mg) by mouth 2 times daily 180 capsule 3     levETIRAcetam (KEPPRA) 500 MG tablet Take 1 tablet (500 mg) by mouth 2 times daily       melatonin 3 MG tablet Take 2 tablets (6 mg) by mouth At Bedtime       nitroGLYcerin (NITROSTAT) 0.4 MG sublingual tablet Place 1 tablet (0.4 mg) under the tongue every 5 minutes as needed 25 tablet 6     oxyCODONE (ROXICODONE) 5 MG tablet Take 0.5 tablets (2.5 mg) by mouth every 6 hours as needed for pain 12 tablet 0     polyethylene glycol (MIRALAX/GLYCOLAX) packet Take 17 g by mouth daily       ranitidine (ZANTAC) 150 MG tablet Take 1 tablet (150 mg) by mouth daily 30 tablet 11     warfarin (COUMADIN) 1 MG tablet Take 1 tablet (1 mg) by mouth See Admin Instructions       REVIEW OF SYSTEMS:  Limited secondary to cognitive impairment but today pt reports no pain when lying still    Objective:  /70   Pulse 97   Temp 97.9  F (36.6  C)   Resp 20   Wt 91.2 kg (201 lb)   SpO2 94%   BMI 26.52 kg/m    Exam:  GENERAL APPEARANCE:  Alert, in no acute distress   HEAD:  Normal, normocephalic, atraumatic  EYE EXAM: normal external eye, conjunctiva, lids,  CARA  NECK EXAM:  no JVD  CHEST/RESP:  respiratory effort normal, lung sounds CTA , no respiratory distress  CV:  Rate reg, rhythm reg, no murmur, trace peripheral edema bilateral LE  M/S:   extremities abnormal, gait abnormal-unable to ambulate and transfer with PAL stand or karel, normal muscle tone, and range of motion normal   SKIN EXAM: R heel with large fluid/blood filled blister on back, L heel with smaller fluid filled mushy area, L 2nd toe with small dry scab in place on tip of toe  NEUROLOGIC EXAM: Normal gross motor movement, tone and coordination. No tremor. Cranial nerves 2-12 are normal tested and grossly at patient's baseline  PSYCH:  Alert and unable to determine orientation due to cognitive impairment      Labs:   Recent labs in [facility] reviewed by me today.     ASSESSMENT/PLAN:  Bacteremia due to Streptococcus  Completing course of IV antibiotics, afebrile.     Cognitive impairment  Unclear baseline mentation, is able to answer simple questions today.     Closed displaced fracture of head of right radius with routine healing, subsequent encounter  With mild erythema, pain with manipulation of R elbow.  Sling for comfort to continue.     Chronic diastolic heart failure (H)  Has been followed by cardiology, and noted previously on ranexa BID.  Upon discharge from hospital only on once daily.  He is reporting some chest/chest wall type pain without dyspnea, unclear historian.      Peripheral vascular disease (H)  Osteomyelitis of toe of left foot (H)  Deep Tissue Injury bilateral heels  Patient with known history of PVD, now with bilateral heel DTI, skin is intact at this time but mushy/blister with dark purple on R.    -keep clean and intact, notify NP if heels open up.  2nd toe on the L with scab on it and without symptoms infection.  The current medical regimen is effective;  continue present plan and medications.     History of Clostridium difficile colitis  No diarrhea now.     History of CVA  (cerebrovascular accident)  No new symptoms, has had x3 CVA in past years.     Essential hypertension  BP within normal limits, The current medical regimen is effective;  continue present plan and medications.     Spinal stenosis of lumbar region, unspecified whether neurogenic claudication present  Other chronic pain  Spouse reports chronic use of oxycodone at home for pain control.  Will initiate scheduled oxycodone here, as he rarely asks for pain medications, this may help him move a bit more, improve his ability to participate in therapy      Depression  Significant depression, on celexa 40 mg daily which is higher than recommended dose for elderly male.  Discussed with spouse.  Will decrease celexa to discontinue as noted below, then start venlafaxine for depression.       transcribed by : Belinda Meyer  1. Call [facility] to schedule neurology, ID and Ortho F/U  2. Discontinue [id number] mg  3. Celexa 20 mg po every day x 3, then 10 mg po every day x 3, then 10 mg po every other day x 3 doses then discontinue.  4. On 6/26/19 start venlafaxine ER 37.5 mg po every day Dx depression  5. Discontinue Ranolazine   6. Ranolazine  mg po BID Dx angina  7. Tylenol 650 mg po TID Dx arthritis pain  8. Schedule oxycodone 2.5 mg po BID and continue prn.  9. Lab draw weekly to include CBC, ESR, CRP, BMP Dx osteomyelitis on mondays    Electronically signed by:  CHAU Reilly CNP

## 2019-06-12 NOTE — LETTER
6/12/2019        RE: Harlan Allen  [address]  [place] [address]        [place] [facility]  [place] Medical Record Number:  [id number]  [facility] Service where encounter took place[facility] ([facility] [[id number]]  Chief Complaint   Patient presents with     Nursing Home Acute     HPI:    Harlan Allen  is a 84 year old (1935), who is being seen today for an episodic care visit.  HPI information obtained from: facility chart records, facility staff, patient report and [facility] chart review. Today's concern is:     Bacteremia due to Streptococcus  Cognitive impairment  Closed displaced fracture of head of right radius with routine healing, subsequent encounter  Chronic diastolic heart failure (H)  Peripheral vascular disease (H)  Osteomyelitis of toe of left foot (H)  History of Clostridium difficile colitis  History of CVA (cerebrovascular accident)  Essential hypertension  Spinal stenosis of lumbar region, unspecified whether neurogenic claudication present  Other chronic pain     Harlan reports minimal information and states he just wants to go home.  Spouse, Yaquelin, is here today and recounts a history of past hospital stay and goals to bring home. Yaquelin reports he would take oxycodone 5 mg at least TID at home to manage chronic pain.  She also notes that he states he would like to die, wonders if his antidepressant can be changed.  She would like to take him home but he needs to be able to transfer.  Nursing reports no new concerns, patient eating a bit better today. .       Past Medical and Surgical History reviewed in Epic today.    MEDICATIONS:  Current Outpatient Medications   Medication Sig Dispense Refill     Acetaminophen (TYLENOL 8 HOUR ARTHRITIS PAIN PO) Take 650 mg by mouth every 8 hours as needed       amiodarone (PACERONE/CODARONE) 200 MG tablet Take 0.5 tablets (100 mg) by mouth daily 45 tablet 3     amLODIPine (NORVASC) 2.5 MG tablet Take 1 tablet  (2.5 mg) by mouth daily       aspirin (ASA) 81 MG chewable tablet Take 1 tablet (81 mg) by mouth daily       aspirin (ASA) 81 MG EC tablet Take 81 mg by mouth daily       atorvastatin (LIPITOR) 40 MG tablet Take 1 tablet (40 mg) by mouth every evening 90 tablet 3     calcitonin, salmon, (MIACALCIN) 200 UNIT/ACT nasal spray Spray 1 spray into one nostril alternating nostrils daily 1 Bottle 11     calcium carbonate-vitamin D (CALCIUM 500/D) 500-200 MG-UNIT tablet Take 2 tablets by mouth daily       cefTRIAXone (ROCEPHIN) 1 GM vial Inject 2 g (2,000 mg) into the vein every 24 hours 20 mL 0     docusate sodium (COLACE) 100 MG capsule Take 200 mg by mouth daily as needed for constipation       ferrous sulfate (FE TABS) 325 (65 Fe) MG EC tablet Take 1 tablet (325 mg) by mouth daily       furosemide (LASIX) 40 MG tablet Take 1 tablet (40 mg) by mouth daily 90 tablet 1     gabapentin (NEURONTIN) 400 MG capsule Take 1 capsule (400 mg) by mouth 2 times daily 180 capsule 3     levETIRAcetam (KEPPRA) 500 MG tablet Take 1 tablet (500 mg) by mouth 2 times daily       melatonin 3 MG tablet Take 2 tablets (6 mg) by mouth At Bedtime       nitroGLYcerin (NITROSTAT) 0.4 MG sublingual tablet Place 1 tablet (0.4 mg) under the tongue every 5 minutes as needed 25 tablet 6     oxyCODONE (ROXICODONE) 5 MG tablet Take 0.5 tablets (2.5 mg) by mouth every 6 hours as needed for pain 12 tablet 0     polyethylene glycol (MIRALAX/GLYCOLAX) packet Take 17 g by mouth daily       ranitidine (ZANTAC) 150 MG tablet Take 1 tablet (150 mg) by mouth daily 30 tablet 11     warfarin (COUMADIN) 1 MG tablet Take 1 tablet (1 mg) by mouth See Admin Instructions       REVIEW OF SYSTEMS:  Limited secondary to cognitive impairment but today pt reports no pain when lying still    Objective:  /70   Pulse 97   Temp 97.9  F (36.6  C)   Resp 20   Wt 91.2 kg (201 lb)   SpO2 94%   BMI 26.52 kg/m     Exam:  GENERAL APPEARANCE:  Alert, in no acute distress    HEAD:  Normal, normocephalic, atraumatic  EYE EXAM: normal external eye, conjunctiva, lids, CARA  NECK EXAM:  no JVD  CHEST/RESP:  respiratory effort normal, lung sounds CTA , no respiratory distress  CV:  Rate reg, rhythm reg, no murmur, trace peripheral edema bilateral LE  M/S:   extremities abnormal, gait abnormal-unable to ambulate and transfer with PAL stand or karel, normal muscle tone, and range of motion normal   SKIN EXAM: R heel with large fluid/blood filled blister on back, L heel with smaller fluid filled mushy area, L 2nd toe with small dry scab in place on tip of toe  NEUROLOGIC EXAM: Normal gross motor movement, tone and coordination. No tremor. Cranial nerves 2-12 are normal tested and grossly at patient's baseline  PSYCH:  Alert and unable to determine orientation due to cognitive impairment      Labs:   Recent labs in [facility] reviewed by me today.     ASSESSMENT/PLAN:  Bacteremia due to Streptococcus  Completing course of IV antibiotics, afebrile.     Cognitive impairment  Unclear baseline mentation, is able to answer simple questions today.     Closed displaced fracture of head of right radius with routine healing, subsequent encounter  With mild erythema, pain with manipulation of R elbow.  Sling for comfort to continue.     Chronic diastolic heart failure (H)  Has been followed by cardiology, and noted previously on ranexa BID.  Upon discharge from hospital only on once daily.  He is reporting some chest/chest wall type pain without dyspnea, unclear historian.      Peripheral vascular disease (H)  Osteomyelitis of toe of left foot (H)  Deep Tissue Injury bilateral heels  Patient with known history of PVD, now with bilateral heel DTI, skin is intact at this time but mushy/blister with dark purple on R.    -keep clean and intact, notify NP if heels open up.  2nd toe on the L with scab on it and without symptoms infection.  The current medical regimen is effective;  continue present plan and  medications.     History of Clostridium difficile colitis  No diarrhea now.     History of CVA (cerebrovascular accident)  No new symptoms, has had x3 CVA in past years.     Essential hypertension  BP within normal limits, The current medical regimen is effective;  continue present plan and medications.     Spinal stenosis of lumbar region, unspecified whether neurogenic claudication present  Other chronic pain  Spouse reports chronic use of oxycodone at home for pain control.  Will initiate scheduled oxycodone here, as he rarely asks for pain medications, this may help him move a bit more, improve his ability to participate in therapy      Depression  Significant depression, on celexa 40 mg daily which is higher than recommended dose for elderly male.  Discussed with spouse.  Will decrease celexa to discontinue as noted below, then start venlafaxine for depression.       transcribed by : Belinda Meyer  1. [facility] to schedule neurology, ID and Ortho F/U  2. Discontinue [id number] mg  3. Celexa 20 mg po every day x 3, then 10 mg po every day x 3, then 10 mg po every other day x 3 doses then discontinue.  4. On 6/26/19 start venlafaxine ER 37.5 mg po every day Dx depression  5. Discontinue Ranolazine   6. Ranolazine  mg po BID Dx angina  7. Tylenol 650 mg po TID Dx arthritis pain  8. Schedule oxycodone 2.5 mg po BID and continue prn.  9. Lab draw weekly to include CBC, ESR, CRP, BMP Dx osteomyelitis on mondays    Electronically signed by:  CHAU Reilly CNP               Sincerely,        CHAU Reilly CNP

## 2019-06-13 NOTE — LETTER
6/13/2019        RE: Harlan Allen  [address]  [place] [address]        [place] [facility]  [place] Medical Record Number:  [id number]  [facility] took place[facility] ([facility] [[id number]]    HPI:    Harlan Allen is a 84 year old  (1935), who is being seen today for an episodic care visit at the above location.   HPI information obtained from: facility chart records, facility staff, patient report and [facility] chart review. Today's concern is INR/Coumadin management for A. Fib    ROS/Subjective:  Bleeding Signs/Symptoms:  None  Thromboembolic Signs/Symptoms:  None  Medication Changes:  No  Dietary Changes:  No  Activity Changes: No  Bacterial/Viral Infection:  Yes: stable on IV abx  Missed Coumadin Doses:  None  On ASA: Yes - 81 mg po q day  Other Concerns:  No    OBJECTIVE:  /67   Pulse 81   Temp 97.5  F (36.4  C)   Resp 18   Wt 89.8 kg (198 lb)   SpO2 97%   BMI 26.12 kg/m     Last INR: 3.1 on 6/10/19  INR Today:  2.4  Current Dose:  2 mg daily     ASSESSMENT:     Persistent atrial fibrillation (H)  Long term current use of anticoagulant therapy  Encounter for therapeutic drug monitoring  Therapeutic INR for goal of 2-3    PLAN:   Orders written by provider at facility  New Dose: 2 mg daily     Next INR: 6/17/19       1. Sleep log x 7 days all shifts  2. Warfarin 2 mg po every day and recheck INR 6/17/19  3. INR was 2.4      Electronically signed by:  CHAU Reilly CNP           Sincerely,        CHAU Reilly CNP

## 2019-06-13 NOTE — PROGRESS NOTES
[place] [facility]  [place] Medical Record Number:  [id number]  [facility] where encounter took place[facility] ([facility] [[id number]]    HPI:    Harlan Allen is a 84 year old  (1935), who is being seen today for an episodic care visit at the above location.   HPI information obtained from: facility chart records, facility staff, patient report and [facility] chart review. Today's concern is INR/Coumadin management for A. Fib    ROS/Subjective:  Bleeding Signs/Symptoms:  None  Thromboembolic Signs/Symptoms:  None  Medication Changes:  No  Dietary Changes:  No  Activity Changes: No  Bacterial/Viral Infection:  Yes: stable on IV abx  Missed Coumadin Doses:  None  On ASA: Yes - 81 mg po q day  Other Concerns:  No    OBJECTIVE:  /67   Pulse 81   Temp 97.5  F (36.4  C)   Resp 18   Wt 89.8 kg (198 lb)   SpO2 97%   BMI 26.12 kg/m    Last INR: 3.1 on 6/10/19  INR Today:  2.4  Current Dose:  2 mg daily     ASSESSMENT:     Persistent atrial fibrillation (H)  Long term current use of anticoagulant therapy  Encounter for therapeutic drug monitoring  Therapeutic INR for goal of 2-3    PLAN:   Orders written by provider at facility  New Dose: 2 mg daily     Next INR: 6/17/19       1. Sleep log x 7 days all shifts  2. Warfarin 2 mg po every day and recheck INR 6/17/19  3. INR was 2.4      Electronically signed by:  CHAU Reilly CNP

## 2021-06-01 ENCOUNTER — RECORDS - HEALTHEAST (OUTPATIENT)
Dept: ADMINISTRATIVE | Facility: CLINIC | Age: 86
End: 2021-06-01

## 2021-10-19 PROBLEM — F32.9 MAJOR DEPRESSION: Status: ACTIVE | Noted: 2019-01-01

## 2022-09-02 NOTE — TELEPHONE ENCOUNTER
Patient requesting a referral to Dr. Roge Ballard at [place], [place] to do a procedure to stretch his esophagus.  He needs this done prior to a cardiology appointment on 3/13/19.    Pended gastroenterology referral for procedure with Dr. Roge Ballard, [facility].   Dosing Month 3 (Required For Cumulative Dosing): 60mg Daily